# Patient Record
Sex: FEMALE | Race: WHITE | NOT HISPANIC OR LATINO
[De-identification: names, ages, dates, MRNs, and addresses within clinical notes are randomized per-mention and may not be internally consistent; named-entity substitution may affect disease eponyms.]

---

## 2017-03-15 PROBLEM — Z00.00 ENCOUNTER FOR PREVENTIVE HEALTH EXAMINATION: Status: ACTIVE | Noted: 2017-03-15

## 2017-04-27 ENCOUNTER — APPOINTMENT (OUTPATIENT)
Dept: OTOLARYNGOLOGY | Facility: CLINIC | Age: 82
End: 2017-04-27

## 2017-11-27 ENCOUNTER — OUTPATIENT (OUTPATIENT)
Dept: OUTPATIENT SERVICES | Facility: HOSPITAL | Age: 82
LOS: 1 days | Discharge: HOME | End: 2017-11-27

## 2017-11-27 DIAGNOSIS — Z12.31 ENCOUNTER FOR SCREENING MAMMOGRAM FOR MALIGNANT NEOPLASM OF BREAST: ICD-10-CM

## 2018-10-13 ENCOUNTER — OUTPATIENT (OUTPATIENT)
Dept: OUTPATIENT SERVICES | Facility: HOSPITAL | Age: 83
LOS: 1 days | Discharge: HOME | End: 2018-10-13

## 2018-10-13 DIAGNOSIS — M25.562 PAIN IN LEFT KNEE: ICD-10-CM

## 2019-01-25 ENCOUNTER — EMERGENCY (EMERGENCY)
Facility: HOSPITAL | Age: 84
LOS: 0 days | Discharge: HOME | End: 2019-01-26
Attending: EMERGENCY MEDICINE | Admitting: EMERGENCY MEDICINE

## 2019-01-25 VITALS
OXYGEN SATURATION: 99 % | DIASTOLIC BLOOD PRESSURE: 81 MMHG | HEART RATE: 67 BPM | RESPIRATION RATE: 20 BRPM | SYSTOLIC BLOOD PRESSURE: 174 MMHG

## 2019-01-25 DIAGNOSIS — Z88.6 ALLERGY STATUS TO ANALGESIC AGENT: ICD-10-CM

## 2019-01-25 DIAGNOSIS — G89.29 OTHER CHRONIC PAIN: ICD-10-CM

## 2019-01-25 DIAGNOSIS — B34.9 VIRAL INFECTION, UNSPECIFIED: ICD-10-CM

## 2019-01-25 DIAGNOSIS — M25.562 PAIN IN LEFT KNEE: ICD-10-CM

## 2019-01-25 DIAGNOSIS — R41.82 ALTERED MENTAL STATUS, UNSPECIFIED: ICD-10-CM

## 2019-01-25 DIAGNOSIS — E11.9 TYPE 2 DIABETES MELLITUS WITHOUT COMPLICATIONS: ICD-10-CM

## 2019-01-25 LAB
ALBUMIN SERPL ELPH-MCNC: 3.6 G/DL — SIGNIFICANT CHANGE UP (ref 3.5–5.2)
ALP SERPL-CCNC: 82 U/L — SIGNIFICANT CHANGE UP (ref 30–115)
ALT FLD-CCNC: 7 U/L — SIGNIFICANT CHANGE UP (ref 0–41)
ANION GAP SERPL CALC-SCNC: 16 MMOL/L — HIGH (ref 7–14)
APPEARANCE UR: CLEAR — SIGNIFICANT CHANGE UP
AST SERPL-CCNC: 9 U/L — SIGNIFICANT CHANGE UP (ref 0–41)
BASOPHILS # BLD AUTO: 0.02 K/UL — SIGNIFICANT CHANGE UP (ref 0–0.2)
BASOPHILS NFR BLD AUTO: 0.2 % — SIGNIFICANT CHANGE UP (ref 0–1)
BILIRUB SERPL-MCNC: 0.4 MG/DL — SIGNIFICANT CHANGE UP (ref 0.2–1.2)
BILIRUB UR-MCNC: NEGATIVE — SIGNIFICANT CHANGE UP
BUN SERPL-MCNC: 12 MG/DL — SIGNIFICANT CHANGE UP (ref 10–20)
CALCIUM SERPL-MCNC: 9.4 MG/DL — SIGNIFICANT CHANGE UP (ref 8.5–10.1)
CHLORIDE SERPL-SCNC: 95 MMOL/L — LOW (ref 98–110)
CO2 SERPL-SCNC: 27 MMOL/L — SIGNIFICANT CHANGE UP (ref 17–32)
COLOR SPEC: YELLOW — SIGNIFICANT CHANGE UP
CREAT SERPL-MCNC: 0.6 MG/DL — LOW (ref 0.7–1.5)
DIFF PNL FLD: NEGATIVE — SIGNIFICANT CHANGE UP
EOSINOPHIL # BLD AUTO: 0.05 K/UL — SIGNIFICANT CHANGE UP (ref 0–0.7)
EOSINOPHIL NFR BLD AUTO: 0.5 % — SIGNIFICANT CHANGE UP (ref 0–8)
GAS PNL BLDV: SIGNIFICANT CHANGE UP
GLUCOSE SERPL-MCNC: 305 MG/DL — HIGH (ref 70–99)
GLUCOSE UR QL: 250 MG/DL
HCT VFR BLD CALC: 35.2 % — LOW (ref 37–47)
HGB BLD-MCNC: 11.8 G/DL — LOW (ref 12–16)
IMM GRANULOCYTES NFR BLD AUTO: 0.5 % — HIGH (ref 0.1–0.3)
KETONES UR-MCNC: 15
LEUKOCYTE ESTERASE UR-ACNC: NEGATIVE — SIGNIFICANT CHANGE UP
LYMPHOCYTES # BLD AUTO: 1.12 K/UL — LOW (ref 1.2–3.4)
LYMPHOCYTES # BLD AUTO: 12.3 % — LOW (ref 20.5–51.1)
MAGNESIUM SERPL-MCNC: 1.3 MG/DL — LOW (ref 1.8–2.4)
MCHC RBC-ENTMCNC: 28.1 PG — SIGNIFICANT CHANGE UP (ref 27–31)
MCHC RBC-ENTMCNC: 33.5 G/DL — SIGNIFICANT CHANGE UP (ref 32–37)
MCV RBC AUTO: 83.8 FL — SIGNIFICANT CHANGE UP (ref 81–99)
MONOCYTES # BLD AUTO: 0.47 K/UL — SIGNIFICANT CHANGE UP (ref 0.1–0.6)
MONOCYTES NFR BLD AUTO: 5.1 % — SIGNIFICANT CHANGE UP (ref 1.7–9.3)
NEUTROPHILS # BLD AUTO: 7.43 K/UL — HIGH (ref 1.4–6.5)
NEUTROPHILS NFR BLD AUTO: 81.4 % — HIGH (ref 42.2–75.2)
NITRITE UR-MCNC: NEGATIVE — SIGNIFICANT CHANGE UP
NT-PROBNP SERPL-SCNC: 1945 PG/ML — HIGH (ref 0–300)
PH UR: 6 — SIGNIFICANT CHANGE UP (ref 5–8)
PLATELET # BLD AUTO: 346 K/UL — SIGNIFICANT CHANGE UP (ref 130–400)
POTASSIUM SERPL-MCNC: 3.8 MMOL/L — SIGNIFICANT CHANGE UP (ref 3.5–5)
POTASSIUM SERPL-SCNC: 3.8 MMOL/L — SIGNIFICANT CHANGE UP (ref 3.5–5)
PROT SERPL-MCNC: 6.8 G/DL — SIGNIFICANT CHANGE UP (ref 6–8)
PROT UR-MCNC: 30 MG/DL
RBC # BLD: 4.2 M/UL — SIGNIFICANT CHANGE UP (ref 4.2–5.4)
RBC # FLD: 13.4 % — SIGNIFICANT CHANGE UP (ref 11.5–14.5)
SODIUM SERPL-SCNC: 138 MMOL/L — SIGNIFICANT CHANGE UP (ref 135–146)
SP GR SPEC: 1.02 — SIGNIFICANT CHANGE UP (ref 1.01–1.03)
TROPONIN T SERPL-MCNC: <0.01 NG/ML — SIGNIFICANT CHANGE UP
UROBILINOGEN FLD QL: 0.2 MG/DL — SIGNIFICANT CHANGE UP (ref 0.2–0.2)
WBC # BLD: 9.14 K/UL — SIGNIFICANT CHANGE UP (ref 4.8–10.8)
WBC # FLD AUTO: 9.14 K/UL — SIGNIFICANT CHANGE UP (ref 4.8–10.8)

## 2019-01-25 RX ORDER — MAGNESIUM SULFATE 500 MG/ML
2 VIAL (ML) INJECTION ONCE
Qty: 0 | Refills: 0 | Status: COMPLETED | OUTPATIENT
Start: 2019-01-25 | End: 2019-01-25

## 2019-01-25 RX ORDER — SODIUM CHLORIDE 9 MG/ML
500 INJECTION INTRAMUSCULAR; INTRAVENOUS; SUBCUTANEOUS ONCE
Qty: 0 | Refills: 0 | Status: COMPLETED | OUTPATIENT
Start: 2019-01-25 | End: 2019-01-25

## 2019-01-25 RX ADMIN — SODIUM CHLORIDE 500 MILLILITER(S): 9 INJECTION INTRAMUSCULAR; INTRAVENOUS; SUBCUTANEOUS at 21:20

## 2019-01-25 RX ADMIN — Medication 50 GRAM(S): at 22:56

## 2019-01-25 NOTE — ED PROVIDER NOTE - MEDICAL DECISION MAKING DETAILS
viral syndrome, well appearing, active, well hydrated, non-toxic.  No warning signs.  Patient is stable for supportive care and discharge..

## 2019-01-25 NOTE — ED ADULT NURSE NOTE - NSIMPLEMENTINTERV_GEN_ALL_ED
Implemented All Universal Safety Interventions:  [place] to call system. Call bell, personal items and telephone within reach. Instruct patient to call for assistance. Room bathroom lighting operational. Non-slip footwear when patient is off stretcher. Physically safe environment: no spills, clutter or unnecessary equipment. Stretcher in lowest position, wheels locked, appropriate side rails in place.

## 2019-01-25 NOTE — ED PROVIDER NOTE - NSFOLLOWUPCLINICS_GEN_ALL_ED_FT
[facility]  Medicine  [address], [place]   Phone: [phone]  Fax:   Follow Up Time: 1-3 Days

## 2019-01-25 NOTE — ED PROVIDER NOTE - PHYSICAL EXAMINATION
Constitutional: Well developed, well nourished. NAD.  Head: Normocephalic, atraumatic.  Eyes: PERRL. EOMI.  ENT: No nasal discharge. Mucous membranes slightly dry.  Neck: Supple. Painless ROM.  Cardiovascular: Normal S1, S2. Regular rate and rhythm. No murmurs, rubs, or gallops.  Pulmonary: Normal respiratory rate and effort. Right gillian rhonchi.  Abdominal: Soft. Nondistended. Nontender. No rebound, guarding, rigidity.  Extremities. Pelvis stable. No lower extremity edema, symmetric calves.  Skin: No rashes, cyanosis.  Neuro: AAOx3. No focal neurological deficits.  Psych: Normal mood. Normal affect.

## 2019-01-25 NOTE — ED PROVIDER NOTE - PROGRESS NOTE DETAILS
Labs and imaging reviewed and discussed with pt. Pt feels better at this time, is back to baseline per family. Family feels comfortable taking pt home. Will d/c pt with strict return precautions.

## 2019-01-25 NOTE — ED PROVIDER NOTE - OBJECTIVE STATEMENT
Pt is a 89 y/o female with hx of DLD, DM, dementia presents to ED for lethargy that started today. Pt has been coughing for 1 week, + congestion. Today pt developed chills and became lethargic. Sx's are mild. No chest pain, SOB, abd pain, n/v/d, urinary complaints. Pt notes chronic left knee pain, has been followed up for it.

## 2019-01-25 NOTE — ED PROVIDER NOTE - NS ED ROS FT
Constitutional: No fever. + chills.  Eyes: No visual changes.  ENT: No hearing changes. No sore throat.  Neck: No neck pain or stiffness.  Cardiovascular: No chest pain, palpitations, edema.  Pulmonary: No SOB. + cough. No hemoptysis.  Abdominal: No abdominal pain, nausea, vomiting, diarrhea.  : No dysuria, frequency.  Neuro: No headache, syncope, dizziness.  MS: No back pain. No calf pain/swelling.  Psych: No suicidal ideations.

## 2019-01-29 RX ORDER — CEFUROXIME AXETIL 250 MG
1 TABLET ORAL
Qty: 14 | Refills: 0
Start: 2019-01-29 | End: 2019-02-04

## 2019-01-31 LAB
CULTURE RESULTS: SIGNIFICANT CHANGE UP
CULTURE RESULTS: SIGNIFICANT CHANGE UP
SPECIMEN SOURCE: SIGNIFICANT CHANGE UP
SPECIMEN SOURCE: SIGNIFICANT CHANGE UP

## 2019-10-15 ENCOUNTER — EMERGENCY (EMERGENCY)
Facility: HOSPITAL | Age: 84
LOS: 0 days | Discharge: HOME | End: 2019-10-16
Attending: EMERGENCY MEDICINE | Admitting: EMERGENCY MEDICINE
Payer: MEDICARE

## 2019-10-15 VITALS
SYSTOLIC BLOOD PRESSURE: 173 MMHG | TEMPERATURE: 97 F | RESPIRATION RATE: 18 BRPM | OXYGEN SATURATION: 99 % | HEART RATE: 67 BPM | DIASTOLIC BLOOD PRESSURE: 72 MMHG

## 2019-10-15 DIAGNOSIS — Z88.6 ALLERGY STATUS TO ANALGESIC AGENT: ICD-10-CM
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
## 2019-10-15 DIAGNOSIS — M17.0 BILATERAL PRIMARY OSTEOARTHRITIS OF KNEE: ICD-10-CM

## 2019-10-15 DIAGNOSIS — Z90.11 ACQUIRED ABSENCE OF RIGHT BREAST AND NIPPLE: Chronic | ICD-10-CM
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
## 2019-10-15 DIAGNOSIS — M25.562 PAIN IN LEFT KNEE: ICD-10-CM

## 2019-10-15 DIAGNOSIS — S80.211A ABRASION, RIGHT KNEE, INITIAL ENCOUNTER: ICD-10-CM
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
## 2019-10-15 PROCEDURE — 99283 EMERGENCY DEPT VISIT LOW MDM: CPT

## 2019-10-15 RX ORDER — SIMVASTATIN 20 MG/1
0 TABLET, FILM COATED ORAL
Qty: 0 | Refills: 0 | DISCHARGE

## 2019-10-15 RX ORDER — ACETAMINOPHEN 500 MG
650 TABLET ORAL ONCE
Refills: 0 | Status: COMPLETED | OUTPATIENT
Start: 2019-10-15 | End: 2019-10-15

## 2019-10-15 RX ORDER — QUETIAPINE FUMARATE 200 MG/1
0 TABLET, FILM COATED ORAL
Qty: 0 | Refills: 0 | DISCHARGE

## 2019-10-15 RX ORDER — DONEPEZIL HYDROCHLORIDE 10 MG/1
0 TABLET, FILM COATED ORAL
Qty: 0 | Refills: 0 | DISCHARGE

## 2019-10-15 RX ORDER — METFORMIN HYDROCHLORIDE 850 MG/1
0 TABLET ORAL
Qty: 0 | Refills: 0 | DISCHARGE

## 2019-10-15 RX ADMIN — Medication 650 MILLIGRAM(S): at 23:39

## 2019-10-15 NOTE — ED ADULT NURSE NOTE - NSIMPLEMENTINTERV_GEN_ALL_ED
Implemented All Fall with Harm Risk Interventions:  [place] to call system. Call bell, personal items and telephone within reach. Instruct patient to call for assistance. Room bathroom lighting operational. Non-slip footwear when patient is off stretcher. Physically safe environment: no spills, clutter or unnecessary equipment. Stretcher in lowest position, wheels locked, appropriate side rails in place. Provide visual cue, wrist band, yellow gown, etc. Monitor gait and stability. Monitor for mental status changes and reorient to person, place, and time. Review medications for side effects contributing to fall risk. Reinforce activity limits and safety measures with patient and family. Provide visual clues: red socks.

## 2019-10-15 NOTE — ED PROVIDER NOTE - ATTENDING CONTRIBUTION TO CARE
89 yo F, history of dementia, OA, DM II, HLD, breast ca sp mastectomy here for assessment of bilateral knee sp pain after tripping and landing on her knees.     No head trauma, no LOC. Ambulatory on scene and after in ED. Initially had minimal pain but then developed aching pain prompting ED visit.    VS normal, patient looks well, is ambulating, has no warmth, redness, swelling or step off.     Likely bruising vs exacerbation of OA however will get XR and reassess.

## 2019-10-15 NOTE — ED PROVIDER NOTE - PHYSICAL EXAMINATION
PHYSICAL EXAM:  GENERAL: NAD, well-developed  HEAD:  Atraumatic, Normocephalic  EYES: EOMI, PERRLA, conjunctiva and sclera clear  NECK: Supple, No JVD  CHEST/LUNG: Clear to auscultation bilaterally; No wheeze  HEART: Regular rate and rhythm; No murmurs, rubs, or gallops  ABDOMEN: Soft, Nontender, Nondistended; Bowel sounds present  EXTREMITIES:  2+ Peripheral Pulses, No clubbing, cyanosis, or edema  PSYCH: AAOx3  NEUROLOGY: non-focal  SKIN: right knee abrasions, left knee tender to touch, reduced passive ROM unable to fully extend, left knee slightly warm to touch

## 2019-10-15 NOTE — ED PROVIDER NOTE - PATIENT PORTAL LINK FT
You can access the FollowMyHealth Patient Portal offered by [facility] by registering at the following website: [email or web address]. By joining [facility]’s FollowMyHealth portal, you will also be able to view your health information using other applications (apps) compatible with our system.

## 2019-10-15 NOTE — ED ADULT NURSE NOTE - OBJECTIVE STATEMENT
pt came to ED with her daughter s/p tripping and falling at home, c/o left knee pain   has abrasions to right knee

## 2019-10-15 NOTE — ED PROVIDER NOTE - NSFOLLOWUPINSTRUCTIONS_ED_ALL_ED_FT
Arthritis    Arthritis is a term that is commonly used to refer to joint pain or joint disease. There are more than 100 types of arthritis.    What are the causes?  The most common cause of this condition is wear and tear of a joint. Other causes include:  Gout.  Inflammation of a joint.  An infection of a joint.  Sprains and other injuries near the joint.  A drug reaction or allergic reaction.  In some cases, the cause may not be known.    What are the signs or symptoms?  The main symptom of this condition is pain in the joint with movement. Other symptoms include:  Redness, swelling, or stiffness at a joint.  Warmth coming from the joint.  Fever.  Overall feeling of illness.    How is this diagnosed?  This condition may be diagnosed with a physical exam and tests, including:  Blood tests.  Urine tests.  Imaging tests, such as MRI, X-rays, or a CT scan.  Sometimes, fluid is removed from a joint for testing.  How is this treated?  Treatment for this condition may involve:  Treatment of the cause, if it is known.  Rest.  Raising (elevating) the joint.  Applying cold or hot packs to the joint.  Medicines to improve symptoms and reduce inflammation.  Injections of a steroid such as cortisone into the joint to help reduce pain and inflammation.  Depending on the cause of your arthritis, you may need to make lifestyle changes to reduce stress on your joint. These changes may include exercising more and losing weight.  Follow these instructions at home:  Medicines     Take over-the-counter and prescription medicines only as told by your health care provider.    Do not take aspirin to relieve pain if gout is suspected.  Activity     Rest your joint if told by your health care provider. Rest is important when your disease is active and your joint feels painful, swollen, or stiff.  Avoid activities that make the pain worse. It is important to balance activity with rest.  Exercise your joint regularly with range-of-motion exercises as told by your health care provider. Try doing low-impact exercise, such as:  Swimming.  Water aerobics  .Biking.  Walking.  Joint Care       If your joint is swollen, keep it elevated if told by your health care provider.  If your joint feels stiff in the morning, try taking a warm shower.  If directed, apply heat to the joint. If you have diabetes, do not apply heat without permission from your health care provider.  Put a towel between the joint and the hot pack or heating pad.  Leave the heat on the area for 20–30 minutes.  If directed, apply ice to the joint:  Put ice in a plastic bag.  Place a towel between your skin and the bag.  Leave the ice on for 20 minutes, 2–3 times per day.  Keep all follow-up visits as told by your health care provider. This is important.    Contact a health care provider if:  The pain gets worse.  You have a fever.  Get help right away if:  You develop severe joint pain, swelling, or redness.  Many joints become painful and swollen.  You develop severe back pain.  You develop severe weakness in your leg.  You cannot control your bladder or bowels.    This information is not intended to replace advice given to you by your health care provider. Make sure you discuss any questions you have with your health care provider.

## 2019-10-15 NOTE — ED PROVIDER NOTE - CLINICAL SUMMARY MEDICAL DECISION MAKING FREE TEXT BOX
XR negative, after tylenol patient is ambulatory without difficulty. No signs of fracture, dislocation.    Advised on return precautions, fall safety at home, tylenol prn pain.

## 2019-10-15 NOTE — ED PROVIDER NOTE - OBJECTIVE STATEMENT
89 yo F w/ hx of dementia, b/l knee OA, DM, DLD, breast CA s/p right mastectomy and chemo p/w b/l knee pain. Left > right. Pt fell on her knees today while washing clothes, she lost balance trying to reach for pail and landed on knees, witnessed by aide, was able to get up and walk after, however c/o pain 1 hour later, not able to characterize, no radiation, 10/10, improves with not applying pressure, worse with ambulation. Denies vertigo, dizziness, LOC, palpitations, CP, SOB at time of fall. She was able to walk in to ED on her own. Denies prior hx of fall.

## 2019-10-15 NOTE — ED PROVIDER NOTE - NS ED ROS FT

## 2019-10-15 NOTE — ED PROVIDER NOTE - CARE PLAN
Principal Discharge DX:	Osteoarthritis of knees, bilateral  Goal:	pain control  Assessment and plan of treatment:	patient can take over the counter pain medication such as Tylenol for pain

## 2019-10-16 PROBLEM — F32.9 MAJOR DEPRESSIVE DISORDER, SINGLE EPISODE, UNSPECIFIED: Chronic | Status: ACTIVE | Noted: 2019-01-25

## 2019-10-16 PROBLEM — E11.9 TYPE 2 DIABETES MELLITUS WITHOUT COMPLICATIONS: Chronic | Status: ACTIVE | Noted: 2019-01-25

## 2019-10-16 PROBLEM — I10 ESSENTIAL (PRIMARY) HYPERTENSION: Chronic | Status: ACTIVE | Noted: 2019-01-25

## 2019-10-16 PROCEDURE — 73562 X-RAY EXAM OF KNEE 3: CPT | Mod: 26,LT,RT

## 2020-03-12 NOTE — ED ADULT NURSE NOTE - NS ED NOTE ABUSE RESPONSE YN
Instructions: This plan will send the code FBSD to the PM system.  DO NOT or CHANGE the price. Price (Do Not Change): 0.00 Detail Level: Simple Yes

## 2020-09-05 ENCOUNTER — INPATIENT (INPATIENT)
Facility: HOSPITAL | Age: 85
LOS: 5 days | Discharge: NURSING, REHAB OR LONG-TERM CARE | End: 2020-09-11
Attending: INTERNAL MEDICINE | Admitting: INTERNAL MEDICINE
Payer: MEDICARE

## 2020-09-05 VITALS
DIASTOLIC BLOOD PRESSURE: 58 MMHG | HEART RATE: 71 BPM | OXYGEN SATURATION: 97 % | SYSTOLIC BLOOD PRESSURE: 129 MMHG | TEMPERATURE: 98 F | RESPIRATION RATE: 18 BRPM

## 2020-09-05 DIAGNOSIS — S72.115A NONDISPLACED FRACTURE OF GREATER TROCHANTER OF LEFT FEMUR, INITIAL ENCOUNTER FOR CLOSED FRACTURE: ICD-10-CM

## 2020-09-05 DIAGNOSIS — D62 ACUTE POSTHEMORRHAGIC ANEMIA: ICD-10-CM

## 2020-09-05 DIAGNOSIS — Z90.11 ACQUIRED ABSENCE OF RIGHT BREAST AND NIPPLE: ICD-10-CM
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
## 2020-09-05 DIAGNOSIS — Z79.84 LONG TERM (CURRENT) USE OF ORAL HYPOGLYCEMIC DRUGS: ICD-10-CM

## 2020-09-05 DIAGNOSIS — Z92.21 PERSONAL HISTORY OF ANTINEOPLASTIC CHEMOTHERAPY: ICD-10-CM

## 2020-09-05 DIAGNOSIS — I11.0 HYPERTENSIVE HEART DISEASE WITH HEART FAILURE: ICD-10-CM

## 2020-09-05 DIAGNOSIS — E78.5 HYPERLIPIDEMIA, UNSPECIFIED: ICD-10-CM

## 2020-09-05 DIAGNOSIS — E83.42 HYPOMAGNESEMIA: ICD-10-CM

## 2020-09-05 DIAGNOSIS — E87.6 HYPOKALEMIA: ICD-10-CM

## 2020-09-05 DIAGNOSIS — Z85.3 PERSONAL HISTORY OF MALIGNANT NEOPLASM OF BREAST: ICD-10-CM
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
## 2020-09-05 DIAGNOSIS — S72.144A NONDISPLACED INTERTROCHANTERIC FRACTURE OF RIGHT FEMUR, INITIAL ENCOUNTER FOR CLOSED FRACTURE: ICD-10-CM

## 2020-09-05 DIAGNOSIS — F02.81 DEMENTIA IN OTHER DISEASES CLASSIFIED ELSEWHERE, UNSPECIFIED SEVERITY, WITH BEHAVIORAL DISTURBANCE: ICD-10-CM

## 2020-09-05 DIAGNOSIS — I44.7 LEFT BUNDLE-BRANCH BLOCK, UNSPECIFIED: ICD-10-CM

## 2020-09-05 DIAGNOSIS — S72.001A FRACTURE OF UNSPECIFIED PART OF NECK OF RIGHT FEMUR, INITIAL ENCOUNTER FOR CLOSED FRACTURE: ICD-10-CM

## 2020-09-05 DIAGNOSIS — Z90.11 ACQUIRED ABSENCE OF RIGHT BREAST AND NIPPLE: Chronic | ICD-10-CM

## 2020-09-05 DIAGNOSIS — R50.9 FEVER, UNSPECIFIED: ICD-10-CM

## 2020-09-05 DIAGNOSIS — G30.9 ALZHEIMER'S DISEASE, UNSPECIFIED: ICD-10-CM

## 2020-09-05 DIAGNOSIS — Z99.3 DEPENDENCE ON WHEELCHAIR: ICD-10-CM

## 2020-09-05 DIAGNOSIS — F32.9 MAJOR DEPRESSIVE DISORDER, SINGLE EPISODE, UNSPECIFIED: ICD-10-CM
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
## 2020-09-05 DIAGNOSIS — E11.65 TYPE 2 DIABETES MELLITUS WITH HYPERGLYCEMIA: ICD-10-CM

## 2020-09-05 DIAGNOSIS — I50.22 CHRONIC SYSTOLIC (CONGESTIVE) HEART FAILURE: ICD-10-CM

## 2020-09-05 PROBLEM — C50.919 MALIGNANT NEOPLASM OF UNSPECIFIED SITE OF UNSPECIFIED FEMALE BREAST: Chronic | Status: ACTIVE | Noted: 2019-10-15

## 2020-09-05 LAB
ALBUMIN SERPL ELPH-MCNC: 3.4 G/DL — LOW (ref 3.5–5.2)
ALP SERPL-CCNC: 66 U/L — SIGNIFICANT CHANGE UP (ref 30–115)
ALT FLD-CCNC: 9 U/L — SIGNIFICANT CHANGE UP (ref 0–41)
ANION GAP SERPL CALC-SCNC: 12 MMOL/L — SIGNIFICANT CHANGE UP (ref 7–14)
AST SERPL-CCNC: 24 U/L — SIGNIFICANT CHANGE UP (ref 0–41)
BASOPHILS # BLD AUTO: 0.05 K/UL — SIGNIFICANT CHANGE UP (ref 0–0.2)
BASOPHILS NFR BLD AUTO: 0.5 % — SIGNIFICANT CHANGE UP (ref 0–1)
BILIRUB SERPL-MCNC: 0.2 MG/DL — SIGNIFICANT CHANGE UP (ref 0.2–1.2)
BUN SERPL-MCNC: 19 MG/DL — SIGNIFICANT CHANGE UP (ref 10–20)
CALCIUM SERPL-MCNC: 8.2 MG/DL — LOW (ref 8.5–10.1)
CHLORIDE SERPL-SCNC: 105 MMOL/L — SIGNIFICANT CHANGE UP (ref 98–110)
CO2 SERPL-SCNC: 24 MMOL/L — SIGNIFICANT CHANGE UP (ref 17–32)
CREAT SERPL-MCNC: 0.7 MG/DL — SIGNIFICANT CHANGE UP (ref 0.7–1.5)
EOSINOPHIL # BLD AUTO: 0.14 K/UL — SIGNIFICANT CHANGE UP (ref 0–0.7)
EOSINOPHIL NFR BLD AUTO: 1.4 % — SIGNIFICANT CHANGE UP (ref 0–8)
GLUCOSE SERPL-MCNC: 107 MG/DL — HIGH (ref 70–99)
HCT VFR BLD CALC: 26.6 % — LOW (ref 37–47)
HGB BLD-MCNC: 8.3 G/DL — LOW (ref 12–16)
IMM GRANULOCYTES NFR BLD AUTO: 0.4 % — HIGH (ref 0.1–0.3)
LYMPHOCYTES # BLD AUTO: 1.1 K/UL — LOW (ref 1.2–3.4)
LYMPHOCYTES # BLD AUTO: 11 % — LOW (ref 20.5–51.1)
MCHC RBC-ENTMCNC: 26.9 PG — LOW (ref 27–31)
MCHC RBC-ENTMCNC: 31.2 G/DL — LOW (ref 32–37)
MCV RBC AUTO: 86.1 FL — SIGNIFICANT CHANGE UP (ref 81–99)
MONOCYTES # BLD AUTO: 0.55 K/UL — SIGNIFICANT CHANGE UP (ref 0.1–0.6)
MONOCYTES NFR BLD AUTO: 5.5 % — SIGNIFICANT CHANGE UP (ref 1.7–9.3)
NEUTROPHILS # BLD AUTO: 8.13 K/UL — HIGH (ref 1.4–6.5)
NEUTROPHILS NFR BLD AUTO: 81.2 % — HIGH (ref 42.2–75.2)
NRBC # BLD: 0 /100 WBCS — SIGNIFICANT CHANGE UP (ref 0–0)
PLATELET # BLD AUTO: 336 K/UL — SIGNIFICANT CHANGE UP (ref 130–400)
POTASSIUM SERPL-MCNC: 5.1 MMOL/L — HIGH (ref 3.5–5)
POTASSIUM SERPL-SCNC: 5.1 MMOL/L — HIGH (ref 3.5–5)
PROT SERPL-MCNC: 6 G/DL — SIGNIFICANT CHANGE UP (ref 6–8)
RBC # BLD: 3.09 M/UL — LOW (ref 4.2–5.4)
RBC # FLD: 13.9 % — SIGNIFICANT CHANGE UP (ref 11.5–14.5)
SODIUM SERPL-SCNC: 141 MMOL/L — SIGNIFICANT CHANGE UP (ref 135–146)
WBC # BLD: 10.01 K/UL — SIGNIFICANT CHANGE UP (ref 4.8–10.8)
WBC # FLD AUTO: 10.01 K/UL — SIGNIFICANT CHANGE UP (ref 4.8–10.8)

## 2020-09-05 PROCEDURE — 73552 X-RAY EXAM OF FEMUR 2/>: CPT | Mod: 26,LT

## 2020-09-05 PROCEDURE — 71045 X-RAY EXAM CHEST 1 VIEW: CPT | Mod: 26

## 2020-09-05 PROCEDURE — 73522 X-RAY EXAM HIPS BI 3-4 VIEWS: CPT | Mod: 26

## 2020-09-05 PROCEDURE — 99285 EMERGENCY DEPT VISIT HI MDM: CPT

## 2020-09-05 PROCEDURE — 72192 CT PELVIS W/O DYE: CPT | Mod: 26

## 2020-09-05 PROCEDURE — 72125 CT NECK SPINE W/O DYE: CPT | Mod: 26

## 2020-09-05 PROCEDURE — 70450 CT HEAD/BRAIN W/O DYE: CPT | Mod: 26

## 2020-09-05 PROCEDURE — 93970 EXTREMITY STUDY: CPT | Mod: 26

## 2020-09-05 RX ORDER — ALPRAZOLAM 0.25 MG
0.5 TABLET ORAL ONCE
Refills: 0 | Status: DISCONTINUED | OUTPATIENT
Start: 2020-09-05 | End: 2020-09-05

## 2020-09-05 RX ORDER — ACETAMINOPHEN 500 MG
650 TABLET ORAL ONCE
Refills: 0 | Status: COMPLETED | OUTPATIENT
Start: 2020-09-05 | End: 2020-09-05

## 2020-09-05 RX ADMIN — Medication 650 MILLIGRAM(S): at 20:43

## 2020-09-05 RX ADMIN — Medication 0.5 MILLIGRAM(S): at 20:57

## 2020-09-05 RX ADMIN — Medication 650 MILLIGRAM(S): at 19:43

## 2020-09-05 NOTE — ED ADULT TRIAGE NOTE - CHIEF COMPLAINT QUOTE
As per family patient taken to [facility] ED yesterday after falling out of bed, today received a phone call from hospital asking her to return after finding hairline fx to left hip

## 2020-09-05 NOTE — CONSULT NOTE ADULT - SUBJECTIVE AND OBJECTIVE BOX
ORTHO CONSULT NOTE    TONYA MARTINEZ  [id number]    Patient is a 89 year old Female who presents to ED after mechanical fall two days ago. Since the fall, she has been unable to bear weight or ambulate. No head trauma or LOC. She presented to outside hospital where she was told she has a left GT fracture. Ambulates with cane at baseline. Denies f/c/cp/sob.    PMH: GREATER TROCHANTER FRACTURE  ^GREATER TROCHANTER FRACTURE  No pertinent family history in first degree relatives  MEWS Score  Breast CA  Depression  Diabetes  Hypertension    PSH: R mastectomy  SH: denies toxic habits  Medications: per chart    Allergies: aspirin (Unknown)      Vitals:  T(C): 36.8 (09-05-20 @ 19:19), Max: 36.8 (09-05-20 @ 19:19)  HR: 76 (09-05-20 @ 19:19) (71 - 76)  BP: 155/67 (09-05-20 @ 19:19) (129/58 - 155/67)  RR: 18 (09-05-20 @ 19:19) (18 - 18)  SpO2: 94% (09-05-20 @ 19:19) (94% - 97%)    PE:  NAD  Unlabored resp on RA  Resting comfortably    LLE:  skin intact  no gross deformity  No pain on log roll/axial load  NTTP knee thigh  TTP GT  No crepitus or step offs  Compartments soft, compressible  SILT sp/dp/s/s/t  Motor intact ehl/fhl/ta/gs  DP palpable  wwp, bcr    RLE:  skin intact  no gross deformity  No pain on log roll/axial load  NTTP knee, thigh  No crepitus or step offs  Compartments soft, compressible  SILT sp/dp/s/s/t  Motor intact ehl/fhl/ta/gs  DP palpable  wwp, bcr      Labs:                        8.3    10.01 )-----------( 336      ( 05 Sep 2020 15:21 )             26.6     09-05    141  |  105  |  19  ----------------------------<  107<H>  5.1<H>   |  24  |  0.7    Ca    8.2<L>      05 Sep 2020 15:21    TPro  6.0  /  Alb  3.4<L>  /  TBili  0.2  /  DBili  x   /  AST  24  /  ALT  9   /  AlkPhos  66  09-05    LIVER FUNCTIONS - ( 05 Sep 2020 15:21 )  Alb: 3.4 g/dL / Pro: 6.0 g/dL / ALK PHOS: 66 U/L / ALT: 9 U/L / AST: 24 U/L / GGT: x             Imaging:  XR Pelvis, hip, femur: L greater trochanter fracture without obvious IT extension  CT pelvis: L greater trochanter fracture without obvious IT extension    A/P: 89 year old Female with L greater trochanter fracture without obvious IT extension  - Recommend MRI pelvis to rule out left IT extension or R side injury  - Recommend Duplex BLE to rule out DVT as patient has been unable to ambulate since fall x 2 days  - pain control  - NWB BLE for now until MRI  - DVT ppx: chem ppx, SCD's  - Covid testing  - CBC, BMP, coags, Type and screen x 2  - IS  - Ortho following ORTHO CONSULT NOTE    TONYA MARTINEZ  [id number]    Time of consult: 20:00  Patient seen at: 20:30    Patient is a 89 year old Female who presents to ED after mechanical fall two days ago. Since the fall, she has been unable to bear weight or ambulate. No head trauma or LOC. She presented to outside hospital where she was told she has a left GT fracture. Ambulates with cane at baseline. Denies f/c/cp/sob.    PMH: GREATER TROCHANTER FRACTURE  ^GREATER TROCHANTER FRACTURE  No pertinent family history in first degree relatives  MEWS Score  Breast CA  Depression  Diabetes  Hypertension    PSH: R mastectomy  SH: denies toxic habits  Medications: per chart    Allergies: aspirin (Unknown)      Vitals:  T(C): 36.8 (09-05-20 @ 19:19), Max: 36.8 (09-05-20 @ 19:19)  HR: 76 (09-05-20 @ 19:19) (71 - 76)  BP: 155/67 (09-05-20 @ 19:19) (129/58 - 155/67)  RR: 18 (09-05-20 @ 19:19) (18 - 18)  SpO2: 94% (09-05-20 @ 19:19) (94% - 97%)    PE:  NAD  Unlabored resp on RA  Resting comfortably    LLE:  skin intact  no gross deformity  No pain on log roll/axial load  NTTP knee thigh  TTP GT  No crepitus or step offs  Compartments soft, compressible  SILT sp/dp/s/s/t  Motor intact ehl/fhl/ta/gs  DP palpable  wwp, bcr    RLE:  skin intact  no gross deformity  No pain on log roll/axial load  NTTP knee, thigh  No crepitus or step offs  Compartments soft, compressible  SILT sp/dp/s/s/t  Motor intact ehl/fhl/ta/gs  DP palpable  wwp, bcr      Labs:                        8.3    10.01 )-----------( 336      ( 05 Sep 2020 15:21 )             26.6     09-05    141  |  105  |  19  ----------------------------<  107<H>  5.1<H>   |  24  |  0.7    Ca    8.2<L>      05 Sep 2020 15:21    TPro  6.0  /  Alb  3.4<L>  /  TBili  0.2  /  DBili  x   /  AST  24  /  ALT  9   /  AlkPhos  66  09-05    LIVER FUNCTIONS - ( 05 Sep 2020 15:21 )  Alb: 3.4 g/dL / Pro: 6.0 g/dL / ALK PHOS: 66 U/L / ALT: 9 U/L / AST: 24 U/L / GGT: x             Imaging:  XR Pelvis, hip, femur: L greater trochanter fracture without obvious IT extension  CT pelvis: L greater trochanter fracture without obvious IT extension    A/P: 89 year old Female with L greater trochanter fracture without obvious IT extension  - Recommend MRI pelvis to rule out left IT extension or R side injury  - Recommend Duplex BLE to rule out DVT as patient has been unable to ambulate since fall x 2 days  - pain control  - NWB BLE for now until MRI  - DVT ppx: chem ppx, SCD's  - Covid testing  - CBC, BMP, coags, Type and screen x 2  - IS  - Ortho following ORTHO CONSULT NOTE    TONYA MARTINEZ  [id number]    Time of consult: 20:00  Patient seen at: 20:30    Patient is a 89 year old Female who presents to ED after mechanical fall two days ago. Since the fall, she has been unable to bear weight or ambulate. No head trauma or LOC. She presented to outside hospital where she was told she has a left GT fracture. Ambulates with cane at baseline. Denies f/c/cp/sob.    PMH: GREATER TROCHANTER FRACTURE  ^GREATER TROCHANTER FRACTURE  No pertinent family history in first degree relatives  MEWS Score  Breast CA  Depression  Diabetes  Hypertension    PSH: R mastectomy  SH: denies toxic habits  Medications: per chart    Allergies: aspirin (Unknown)      Vitals:  T(C): 36.8 (09-05-20 @ 19:19), Max: 36.8 (09-05-20 @ 19:19)  HR: 76 (09-05-20 @ 19:19) (71 - 76)  BP: 155/67 (09-05-20 @ 19:19) (129/58 - 155/67)  RR: 18 (09-05-20 @ 19:19) (18 - 18)  SpO2: 94% (09-05-20 @ 19:19) (94% - 97%)    PE:  NAD  Unlabored resp on RA  Resting comfortably    LLE:  skin intact  no gross deformity  No pain on log roll/axial load  NTTP knee thigh  TTP GT  No crepitus or step offs  Compartments soft, compressible  SILT sp/dp/s/s/t  Motor intact ehl/fhl/ta/gs  DP palpable  wwp, bcr    RLE:  skin intact  no gross deformity  No pain on log roll/axial load  NTTP knee, thigh  No crepitus or step offs  Compartments soft, compressible  SILT sp/dp/s/s/t  Motor intact ehl/fhl/ta/gs  DP palpable  wwp, bcr      Labs:                        8.3    10.01 )-----------( 336      ( 05 Sep 2020 15:21 )             26.6     09-05    141  |  105  |  19  ----------------------------<  107<H>  5.1<H>   |  24  |  0.7    Ca    8.2<L>      05 Sep 2020 15:21    TPro  6.0  /  Alb  3.4<L>  /  TBili  0.2  /  DBili  x   /  AST  24  /  ALT  9   /  AlkPhos  66  09-05    LIVER FUNCTIONS - ( 05 Sep 2020 15:21 )  Alb: 3.4 g/dL / Pro: 6.0 g/dL / ALK PHOS: 66 U/L / ALT: 9 U/L / AST: 24 U/L / GGT: x             Imaging:  XR Pelvis, hip, femur: L greater trochanter fracture without obvious IT extension. Possible R valgus impacted femoral neck fracture of unknown chronicity  CT pelvis: L greater trochanter fracture without obvious IT extension. Possible R valgus impacted femoral neck fracture of unknown chronicity    A/P: 89 year old Female with L greater trochanter fracture without obvious IT extension. Possible R valgus impacted femoral neck fracture of unknown chronicity  - Recommend MRI pelvis to rule out left IT extension or R side fracture  - Recommend Duplex BLE to rule out DVT as patient has been unable to ambulate since fall x 2 days  - pain control  - NWB BLE for now until MRI  - DVT ppx: chem ppx, SCD's  - Covid testing  - CBC, BMP, coags, Type and screen x 2  - IS  - Ortho following

## 2020-09-05 NOTE — ED PROVIDER NOTE - CLINICAL SUMMARY MEDICAL DECISION MAKING FREE TEXT BOX
Patient presents with hip pain after a fall. Was called by another ED for positive hip fracture found on xray. labs, xray, ct done. Found to have an acute hip fracture. Patient admitted for further management.

## 2020-09-05 NOTE — ED PROVIDER NOTE - PROGRESS NOTE DETAILS
ATTENDING NOTE: I personally evaluated the patient. I reviewed the Physician Assistant’s note (as assigned above), and agree with the findings and plan except as documented in my note.   89 y/o F with PMH of DM and dementia, had a witnessed fall out of bed Thursday morning, yesterday was seen at [place] where she had XR done, and was sent home but received a call today stating she has a hip FX. Family lives in  and brought her here for further treatment. Pt notes R hip pain over this time and is able to ambulate with a cane, but is limping. No other pain or injuries; no AC. As per family, pt is acting her nl self. Pt has advanced dementia so unable to characterize her SX.   CONSTITUTIONAL: Well-developed; well-nourished; in no acute distress. SKIN: warm, dry. HEAD: Normocephalic; atraumatic. EYES: PERRL, EOMI, no conjunctival erythema. ENT: No nasal discharge; airway clear. NECK: Supple; non tender. CARD: S1, S2 normal. Regular rate and rhythm.  RESP: No wheezes, rales or rhonchi. ABD: soft non tender, non distended, no rebound or guarding. EXT: (+) TTP over anterior R hip with 4/5 strength compared to L which is 5/5, NVI, (+) pain with rolling of the RLE. LYMPH: No acute cervical adenopathy. NEURO: responsive to painful stimuli, no focal deficits, neurovascularly intact. PSYCH: Cooperative, appropriate.  Plan for blood work, CXR and CT. Discussed case with ortho, will see pt in ED

## 2020-09-05 NOTE — ED ADULT NURSE NOTE - NSIMPLEMENTINTERV_GEN_ALL_ED
Implemented All Fall with Harm Risk Interventions:  [place] to call system. Call bell, personal items and telephone within reach. Instruct patient to call for assistance. Room bathroom lighting operational. Non-slip footwear when patient is off stretcher. Physically safe environment: no spills, clutter or unnecessary equipment. Stretcher in lowest position, wheels locked, appropriate side rails in place. Provide visual cue, wrist band, yellow gown, etc. Monitor gait and stability. Monitor for mental status changes and reorient to person, place, and time. Review medications for side effects contributing to fall risk. Reinforce activity limits and safety measures with patient and family. Provide visual clues: red socks.

## 2020-09-05 NOTE — ED PROVIDER NOTE - OBJECTIVE STATEMENT
89 year old F coming from home, hx of DM, DLD, breast ca s/p rt mastectomy/chemo presenting for poss L hip fx. As per daughter pt had witnesses fall out of bed x 2 days ago onto L hip. No head trauma/loc. STs went to [facility] ED yesterday and was called back for L hip fx. Pt is mostly wheelchair bound. She has otherwise been acting at baseline since fall. No decreased po intake, vomiting, worsening confusion, cough, fever, uri symptoms.

## 2020-09-05 NOTE — ED PROVIDER NOTE - PHYSICAL EXAMINATION
CONSTITUTIONAL: Well-appearing; well-nourished; in no apparent distress.   EYES: PERRL; EOM intact.   ENT: normal nose; no rhinorrhea; normal pharynx with no tonsillar hypertrophy.   NECK: Supple; non-tender; no cervical lymphadenopathy.   CARDIOVASCULAR: Normal S1, S2; no murmurs, rubs, or gallops.   RESPIRATORY: Normal chest excursion with respiration; breath sounds clear and equal bilaterally; no wheezes, rhonchi, or rales.  GI/: Normal bowel sounds; non-distended; non-tender; no palpable organomegaly.   MS: No evidence of trauma or deformity. Normal ROM in all four extremities; non-tender to palpation; Pedal pulses intact  SKIN: Normal for age and race; warm; dry; good turgor; no apparent lesions or exudate. No ecchymosis, abrasions, lacerations

## 2020-09-05 NOTE — ED PROVIDER NOTE - ATTENDING CONTRIBUTION TO CARE
ATTENDING NOTE: I personally evaluated the patient. I reviewed the Physician Assistant’s note (as assigned above), and agree with the findings and plan except as documented in my note.   89 y/o F with PMH of DM and dementia, had a witnessed fall out of bed Thursday morning, yesterday was seen at [place] where she had XR done, and was sent home but received a call today stating she has a hip FX. Family lives in  and brought her here for further treatment. Pt notes R hip pain over this time and is able to ambulate with a cane, but is limping. No other pain or injuries; no AC. As per family, pt is acting her nl self. Pt has advanced dementia so unable to characterize her SX.   CONSTITUTIONAL: Well-developed; well-nourished; in no acute distress. SKIN: warm, dry. HEAD: Normocephalic; atraumatic. EYES: PERRL, EOMI, no conjunctival erythema. ENT: No nasal discharge; airway clear. NECK: Supple; non tender. CARD: S1, S2 normal. Regular rate and rhythm.  RESP: No wheezes, rales or rhonchi. ABD: soft non tender, non distended, no rebound or guarding. EXT: (+) TTP over anterior R hip with 4/5 strength compared to L which is 5/5, NVI, (+) pain with rolling of the RLE. LYMPH: No acute cervical adenopathy. NEURO: responsive to painful stimuli, no focal deficits, neurovascularly intact. PSYCH: Cooperative, appropriate.  Plan for blood work, CXR and CT.

## 2020-09-05 NOTE — CONSULT NOTE ADULT - ATTENDING COMMENTS
agree with above  will need beth hip mri  need to eval if GT fx on left extends into hip and to see if possible impacted FN fx on right is chronic/acute  NWB BLE

## 2020-09-05 NOTE — ED ADULT NURSE NOTE - CHIEF COMPLAINT QUOTE
As per family patient taken to [facility] ED yesterday after falling out of bed, today received a phone call from hospital asking her to return after finding hairline fx to left hip

## 2020-09-05 NOTE — ED ADULT NURSE NOTE - SUICIDE SCREENING QUESTION 3
I will START or STAY ON the medications listed below when I get home from the hospital:    aspirin 81 mg oral tablet  -- 1 tab(s) by mouth once a day  -- Indication: For home med     acetaminophen 325 mg oral tablet  -- 2 tab(s) by mouth every 6 hours, As needed, Mild Pain (1 - 3)  -- Indication: For for mild pain     senna oral tablet  -- 2 tab(s) by mouth once a day (at bedtime), As needed, Constipation  -- Indication: For bowel regimen     docusate sodium 100 mg oral capsule  -- 1 cap(s) by mouth 3 times a day  -- Indication: For bowel regimen
No

## 2020-09-05 NOTE — ED ADULT NURSE NOTE - OBJECTIVE STATEMENT
Patient fell yesterday and was seen at [facility]- patient was d/lluvia home and called to come back for hip fracture. Patient c/o left sided hip pain and knee pain, no obvious s/s of deformity noted. +pulses present in left LLE.

## 2020-09-05 NOTE — ED ADULT NURSE REASSESSMENT NOTE - NS ED NURSE REASSESS COMMENT FT1
Pt reassessed at change of shift. VSS. Pt c/o right hip pain pa lance made aware. Tylenol ordered. Daughter at bedside. will continue to monitor. Pt reassessed at change of shift. VSS. Pt c/o left hip pain pa lance made aware. Tylenol ordered. Daughter at bedside. will continue to monitor.

## 2020-09-06 DIAGNOSIS — R09.89 OTHER SPECIFIED SYMPTOMS AND SIGNS INVOLVING THE CIRCULATORY AND RESPIRATORY SYSTEMS: ICD-10-CM

## 2020-09-06 LAB
ALBUMIN SERPL ELPH-MCNC: 3.5 G/DL — SIGNIFICANT CHANGE UP (ref 3.5–5.2)
ALP SERPL-CCNC: 68 U/L — SIGNIFICANT CHANGE UP (ref 30–115)
ALT FLD-CCNC: 9 U/L — SIGNIFICANT CHANGE UP (ref 0–41)
ANION GAP SERPL CALC-SCNC: 12 MMOL/L — SIGNIFICANT CHANGE UP (ref 7–14)
ANION GAP SERPL CALC-SCNC: 14 MMOL/L — SIGNIFICANT CHANGE UP (ref 7–14)
APTT BLD: 27.2 SEC — SIGNIFICANT CHANGE UP (ref 27–39.2)
AST SERPL-CCNC: 15 U/L — SIGNIFICANT CHANGE UP (ref 0–41)
BASOPHILS # BLD AUTO: 0.03 K/UL — SIGNIFICANT CHANGE UP (ref 0–0.2)
BASOPHILS NFR BLD AUTO: 0.4 % — SIGNIFICANT CHANGE UP (ref 0–1)
BILIRUB SERPL-MCNC: 0.8 MG/DL — SIGNIFICANT CHANGE UP (ref 0.2–1.2)
BLD GP AB SCN SERPL QL: SIGNIFICANT CHANGE UP
BUN SERPL-MCNC: 14 MG/DL — SIGNIFICANT CHANGE UP (ref 10–20)
BUN SERPL-MCNC: 15 MG/DL — SIGNIFICANT CHANGE UP (ref 10–20)
CALCIUM SERPL-MCNC: 8.5 MG/DL — SIGNIFICANT CHANGE UP (ref 8.5–10.1)
CALCIUM SERPL-MCNC: 8.9 MG/DL — SIGNIFICANT CHANGE UP (ref 8.5–10.1)
CHLORIDE SERPL-SCNC: 101 MMOL/L — SIGNIFICANT CHANGE UP (ref 98–110)
CHLORIDE SERPL-SCNC: 105 MMOL/L — SIGNIFICANT CHANGE UP (ref 98–110)
CO2 SERPL-SCNC: 24 MMOL/L — SIGNIFICANT CHANGE UP (ref 17–32)
CO2 SERPL-SCNC: 25 MMOL/L — SIGNIFICANT CHANGE UP (ref 17–32)
CREAT SERPL-MCNC: 0.6 MG/DL — LOW (ref 0.7–1.5)
CREAT SERPL-MCNC: 0.6 MG/DL — LOW (ref 0.7–1.5)
EOSINOPHIL # BLD AUTO: 0.18 K/UL — SIGNIFICANT CHANGE UP (ref 0–0.7)
EOSINOPHIL NFR BLD AUTO: 2.1 % — SIGNIFICANT CHANGE UP (ref 0–8)
GLUCOSE BLDC GLUCOMTR-MCNC: 124 MG/DL — HIGH (ref 70–99)
GLUCOSE BLDC GLUCOMTR-MCNC: 260 MG/DL — HIGH (ref 70–99)
GLUCOSE SERPL-MCNC: 146 MG/DL — HIGH (ref 70–99)
GLUCOSE SERPL-MCNC: 317 MG/DL — HIGH (ref 70–99)
HCT VFR BLD CALC: 27.1 % — LOW (ref 37–47)
HCT VFR BLD CALC: 27.3 % — LOW (ref 37–47)
HGB BLD-MCNC: 8.3 G/DL — LOW (ref 12–16)
HGB BLD-MCNC: 8.7 G/DL — LOW (ref 12–16)
IMM GRANULOCYTES NFR BLD AUTO: 0.4 % — HIGH (ref 0.1–0.3)
INR BLD: 1.24 RATIO — SIGNIFICANT CHANGE UP (ref 0.65–1.3)
LYMPHOCYTES # BLD AUTO: 1.04 K/UL — LOW (ref 1.2–3.4)
LYMPHOCYTES # BLD AUTO: 12.3 % — LOW (ref 20.5–51.1)
MAGNESIUM SERPL-MCNC: 1.4 MG/DL — LOW (ref 1.8–2.4)
MAGNESIUM SERPL-MCNC: 1.6 MG/DL — LOW (ref 1.8–2.4)
MCHC RBC-ENTMCNC: 26.4 PG — LOW (ref 27–31)
MCHC RBC-ENTMCNC: 27.1 PG — SIGNIFICANT CHANGE UP (ref 27–31)
MCHC RBC-ENTMCNC: 30.4 G/DL — LOW (ref 32–37)
MCHC RBC-ENTMCNC: 32.1 G/DL — SIGNIFICANT CHANGE UP (ref 32–37)
MCV RBC AUTO: 84.4 FL — SIGNIFICANT CHANGE UP (ref 81–99)
MCV RBC AUTO: 86.9 FL — SIGNIFICANT CHANGE UP (ref 81–99)
MONOCYTES # BLD AUTO: 0.59 K/UL — SIGNIFICANT CHANGE UP (ref 0.1–0.6)
MONOCYTES NFR BLD AUTO: 7 % — SIGNIFICANT CHANGE UP (ref 1.7–9.3)
NEUTROPHILS # BLD AUTO: 6.56 K/UL — HIGH (ref 1.4–6.5)
NEUTROPHILS NFR BLD AUTO: 77.8 % — HIGH (ref 42.2–75.2)
NRBC # BLD: 0 /100 WBCS — SIGNIFICANT CHANGE UP (ref 0–0)
NRBC # BLD: 0 /100 WBCS — SIGNIFICANT CHANGE UP (ref 0–0)
PLATELET # BLD AUTO: 343 K/UL — SIGNIFICANT CHANGE UP (ref 130–400)
PLATELET # BLD AUTO: 392 K/UL — SIGNIFICANT CHANGE UP (ref 130–400)
POTASSIUM SERPL-MCNC: 3.7 MMOL/L — SIGNIFICANT CHANGE UP (ref 3.5–5)
POTASSIUM SERPL-MCNC: 3.9 MMOL/L — SIGNIFICANT CHANGE UP (ref 3.5–5)
POTASSIUM SERPL-SCNC: 3.7 MMOL/L — SIGNIFICANT CHANGE UP (ref 3.5–5)
POTASSIUM SERPL-SCNC: 3.9 MMOL/L — SIGNIFICANT CHANGE UP (ref 3.5–5)
PROT SERPL-MCNC: 6 G/DL — SIGNIFICANT CHANGE UP (ref 6–8)
PROTHROM AB SERPL-ACNC: 14.3 SEC — HIGH (ref 9.95–12.87)
RBC # BLD: 3.14 M/UL — LOW (ref 4.2–5.4)
RBC # BLD: 3.21 M/UL — LOW (ref 4.2–5.4)
RBC # FLD: 13.9 % — SIGNIFICANT CHANGE UP (ref 11.5–14.5)
RBC # FLD: 14 % — SIGNIFICANT CHANGE UP (ref 11.5–14.5)
SARS-COV-2 RNA SPEC QL NAA+PROBE: SIGNIFICANT CHANGE UP
SODIUM SERPL-SCNC: 139 MMOL/L — SIGNIFICANT CHANGE UP (ref 135–146)
SODIUM SERPL-SCNC: 142 MMOL/L — SIGNIFICANT CHANGE UP (ref 135–146)
WBC # BLD: 11.34 K/UL — HIGH (ref 4.8–10.8)
WBC # BLD: 8.43 K/UL — SIGNIFICANT CHANGE UP (ref 4.8–10.8)
WBC # FLD AUTO: 11.34 K/UL — HIGH (ref 4.8–10.8)
WBC # FLD AUTO: 8.43 K/UL — SIGNIFICANT CHANGE UP (ref 4.8–10.8)

## 2020-09-06 PROCEDURE — 99223 1ST HOSP IP/OBS HIGH 75: CPT | Mod: AI

## 2020-09-06 PROCEDURE — 93010 ELECTROCARDIOGRAM REPORT: CPT

## 2020-09-06 RX ORDER — CITALOPRAM 10 MG/1
0 TABLET, FILM COATED ORAL
Qty: 0 | Refills: 0 | DISCHARGE

## 2020-09-06 RX ORDER — ALPRAZOLAM 0.25 MG
0.5 TABLET ORAL AT BEDTIME
Refills: 0 | Status: DISCONTINUED | OUTPATIENT
Start: 2020-09-06 | End: 2020-09-07

## 2020-09-06 RX ORDER — INSULIN LISPRO 100/ML
3 VIAL (ML) SUBCUTANEOUS ONCE
Refills: 0 | Status: COMPLETED | OUTPATIENT
Start: 2020-09-06 | End: 2020-09-06

## 2020-09-06 RX ORDER — MORPHINE SULFATE 50 MG/1
2 CAPSULE, EXTENDED RELEASE ORAL EVERY 6 HOURS
Refills: 0 | Status: DISCONTINUED | OUTPATIENT
Start: 2020-09-06 | End: 2020-09-07

## 2020-09-06 RX ORDER — SIMVASTATIN 20 MG/1
20 TABLET, FILM COATED ORAL AT BEDTIME
Refills: 0 | Status: DISCONTINUED | OUTPATIENT
Start: 2020-09-06 | End: 2020-09-07

## 2020-09-06 RX ORDER — MAGNESIUM SULFATE 500 MG/ML
2 VIAL (ML) INJECTION ONCE
Refills: 0 | Status: COMPLETED | OUTPATIENT
Start: 2020-09-06 | End: 2020-09-06

## 2020-09-06 RX ORDER — CHLORHEXIDINE GLUCONATE 213 G/1000ML
1 SOLUTION TOPICAL
Refills: 0 | Status: DISCONTINUED | OUTPATIENT
Start: 2020-09-06 | End: 2020-09-07

## 2020-09-06 RX ORDER — QUETIAPINE FUMARATE 200 MG/1
0.5 TABLET, FILM COATED ORAL
Qty: 0 | Refills: 0 | DISCHARGE

## 2020-09-06 RX ORDER — ENOXAPARIN SODIUM 100 MG/ML
40 INJECTION SUBCUTANEOUS AT BEDTIME
Refills: 0 | Status: DISCONTINUED | OUTPATIENT
Start: 2020-09-06 | End: 2020-09-07

## 2020-09-06 RX ORDER — ACETAMINOPHEN 500 MG
650 TABLET ORAL ONCE
Refills: 0 | Status: COMPLETED | OUTPATIENT
Start: 2020-09-06 | End: 2020-09-06

## 2020-09-06 RX ORDER — QUETIAPINE FUMARATE 200 MG/1
25 TABLET, FILM COATED ORAL ONCE
Refills: 0 | Status: DISCONTINUED | OUTPATIENT
Start: 2020-09-06 | End: 2020-09-07

## 2020-09-06 RX ORDER — PANTOPRAZOLE SODIUM 20 MG/1
40 TABLET, DELAYED RELEASE ORAL
Refills: 0 | Status: DISCONTINUED | OUTPATIENT
Start: 2020-09-06 | End: 2020-09-07

## 2020-09-06 RX ORDER — OXYCODONE AND ACETAMINOPHEN 5; 325 MG/1; MG/1
1 TABLET ORAL ONCE
Refills: 0 | Status: DISCONTINUED | OUTPATIENT
Start: 2020-09-06 | End: 2020-09-06

## 2020-09-06 RX ORDER — DONEPEZIL HYDROCHLORIDE 10 MG/1
10 TABLET, FILM COATED ORAL AT BEDTIME
Refills: 0 | Status: DISCONTINUED | OUTPATIENT
Start: 2020-09-06 | End: 2020-09-07

## 2020-09-06 RX ADMIN — Medication 650 MILLIGRAM(S): at 19:35

## 2020-09-06 RX ADMIN — OXYCODONE AND ACETAMINOPHEN 1 TABLET(S): 5; 325 TABLET ORAL at 02:40

## 2020-09-06 RX ADMIN — DONEPEZIL HYDROCHLORIDE 10 MILLIGRAM(S): 10 TABLET, FILM COATED ORAL at 21:49

## 2020-09-06 RX ADMIN — Medication 25 GRAM(S): at 23:00

## 2020-09-06 RX ADMIN — Medication 3 UNIT(S): at 23:00

## 2020-09-06 RX ADMIN — OXYCODONE AND ACETAMINOPHEN 1 TABLET(S): 5; 325 TABLET ORAL at 02:09

## 2020-09-06 RX ADMIN — ENOXAPARIN SODIUM 40 MILLIGRAM(S): 100 INJECTION SUBCUTANEOUS at 21:28

## 2020-09-06 RX ADMIN — MORPHINE SULFATE 2 MILLIGRAM(S): 50 CAPSULE, EXTENDED RELEASE ORAL at 13:24

## 2020-09-06 RX ADMIN — MORPHINE SULFATE 2 MILLIGRAM(S): 50 CAPSULE, EXTENDED RELEASE ORAL at 13:48

## 2020-09-06 RX ADMIN — MORPHINE SULFATE 2 MILLIGRAM(S): 50 CAPSULE, EXTENDED RELEASE ORAL at 18:45

## 2020-09-06 RX ADMIN — Medication 650 MILLIGRAM(S): at 18:57

## 2020-09-06 RX ADMIN — SIMVASTATIN 20 MILLIGRAM(S): 20 TABLET, FILM COATED ORAL at 21:49

## 2020-09-06 RX ADMIN — Medication 0.5 MILLIGRAM(S): at 21:28

## 2020-09-06 RX ADMIN — MORPHINE SULFATE 2 MILLIGRAM(S): 50 CAPSULE, EXTENDED RELEASE ORAL at 19:00

## 2020-09-06 NOTE — H&P ADULT - NSHPLABSRESULTS_GEN_ALL_CORE
8.3    10.01 )-----------( 336      ( 05 Sep 2020 15:21 )             26.6       09-05    141  |  105  |  19  ----------------------------<  107<H>  5.1<H>   |  24  |  0.7    Ca    8.2<L>      05 Sep 2020 15:21    TPro  6.0  /  Alb  3.4<L>  /  TBili  0.2  /  DBili  x   /  AST  24  /  ALT  9   /  AlkPhos  66  09-05              CAPILLARY BLOOD GLUCOSE

## 2020-09-06 NOTE — PROGRESS NOTE ADULT - SUBJECTIVE AND OBJECTIVE BOX
Patient will require medical clearance for possible R hip CRPP and possible L hip ORIF.  Above procedures will go depending upon MRI results    Will require medical clearance/optimization prior to OR

## 2020-09-06 NOTE — CHART NOTE - NSCHARTNOTEFT_GEN_A_CORE
Patient is waiting for MRI, less likely now to get procedure done today, starting diet and keeping NPO @ [facility] and as per ortho starting chemo + mechanical dvt prophylaxis

## 2020-09-06 NOTE — H&P ADULT - NSHPPHYSICALEXAM_GEN_ALL_CORE
GENERAL: NAD, lying in bed comfortably, confused, was not speaking in english AO1  HEAD:  Atraumatic, Normocephalic  EYES: EOMI, PERRLA, conjunctiva and sclera clear  ENT: Moist mucous membranes  NECK: Supple, No JVD  CHEST/LUNG: Clear to auscultation bilaterally; No rales, rhonchi, wheezing, or rubs.   HEART: Regular rate and rhythm; No murmurs, rubs, or gallops  ABDOMEN: Bowel sounds present; Soft, Nontender, Nondistended.  EXTREMITIES:  2+ Peripheral Pulses, brisk capillary refill. No clubbing, cyanosis, or edema, unable to asses lower due to pain   NERVOUS SYSTEM:  Alert & Oriented 1, speech clear.  SKIN: No rashes or lesions

## 2020-09-06 NOTE — H&P ADULT - ATTENDING COMMENTS
HPI:  89 year old F coming from home, hx of DM, DLD, breast ca s/p rt mastectomy/chemo presenting for poss L hip fx. As per daughter pt had witnesses fall out of bed x 2 days ago onto L hip. No head trauma/loc. STs went to [facility] ED yesterday and was called back for L hip fx. Pt is mostly wheelchair bound but can walk with cane. She has otherwise been acting at baseline since fall. No decreased po intake, vomiting, worsening confusion (baseline confused AO1), cough, fever, uri symptoms, urinary or GI sx.    In the ED, Hg 8.3 with baseline 11.8 from January 2019,  Acute comminuted fracture of the left greater trochanter with 3 cm displacement superiorly. Associated hematoma at the fracture site measuring 3.3 x 2.0 cm, ortho consulted and following (06 Sep 2020 00:13)    REVIEW OF SYSTEMS: see cc/HPI  CONSTITUTIONAL: No weakness, fevers or chills  EYES/ENT: No visual changes;  No vertigo or throat pain   NECK: No pain or stiffness  RESPIRATORY: No cough, wheezing, hemoptysis; No shortness of breath  CARDIOVASCULAR: No chest pain or palpitations  GASTROINTESTINAL: No abdominal or epigastric pain. No nausea, vomiting, or hematemesis; No diarrhea or constipation. No melena or hematochezia.  GENITOURINARY: No dysuria, frequency or hematuria  NEUROLOGICAL: No numbness or weakness  SKIN: No itching, rashes    Physical Exam:  General: WN/WD NAD  Neurology: A&Ox3, nonfocal, follows commands  Eyes: PERRLA/ EOMI  ENT/Neck: Neck supple, trachea midline, No JVD  Respiratory: CTA B/L, No wheezing, rales, rhonchi  CV: Normal rate regular rhythm, S1S2, (+) LOLIS  II/VI, NO rubs or gallops  Abdominal: Soft, NT, ND +BS,   Extremities: No edema, + peripheral pulses  Skin: No Rashes, Hematoma, Ecchymosis  Incisions: n/a  Tubes: n/a    A/p  L hip fracture - 3 cm superiorly displaced   -NWB  -MRI of the hip   -PRN pain Rx  -Patient has limited mobility and LOLIS on PE - will get 2D echo to assess EF and r/o severe AS  -Ortho follow up      Acute anemia - possibly related to the fracture   -serial CBC   -F/U MRI findings    DM type II   -F/S monitoring and Lantus/ Lispro if > 180 or NPO   -c/w outpatient Rx for now    Dementia / Alzheimer's type   -c/w SSRI, Donepezil and Seroquel     DVT prophylaxis

## 2020-09-06 NOTE — H&P ADULT - ASSESSMENT
89 year old F coming from home, hx of DM, DLD, breast ca s/p rt mastectomy/chemo presenting for poss L hip fx    #L greater trochanter fracture without obvious IT extension  -f/u lower ext duplex  -NWB BLE for now until MRI  -MRI pelvis to rule out left IT extension or R side injury  - Ortho following     #DM  #DLD  #breast cancer s/p rt mastectomy/ chemo outpatient follow up      Activity NWB BLE for now until MRI   Diet regular npo for now  DVT PPX scd  GI ppx ppi  Dispo from home   Code full 89 year old F coming from home, hx of DM, DLD, breast ca s/p rt mastectomy/chemo presenting for poss L hip fx    #L greater trochanter fracture without obvious IT extension  -f/u lower ext duplex  -NWB BLE for now until MRI  -MRI pelvis to rule out left IT extension or R side injury  - Ortho following     #normocytic anemia Hg 8.3 with baseline 11.8 from January 2019 with hematoma at the fracture site measuring 3.3 x 2.0 cm, trend Hg, active t&S  #DM  #DLD  #breast cancer s/p rt mastectomy/ chemo outpatient follow up      Activity NWB BLE for now until MRI   Diet regular npo for now  DVT PPX scd  GI ppx ppi  Dispo from home   Code full 89 year old F coming from home, hx of DM, DLD, breast ca s/p rt mastectomy/chemo presenting for L greater trochanter Fx     #L greater trochanter fracture without obvious IT extension  -f/u lower ext duplex  -NWB BLE for now until MRI  -MRI pelvis to rule out left IT extension or R side injury  -pain medication as needed  - Ortho following     #normocytic anemia Hg 8.3 with baseline 11.8 from January 2019  -hematoma at the fracture site measuring 3.3 x 2.0 cm,  -trend Hg  active t&S    #DM reconcile meds in the AM  #DLD reconcile meds  #breast cancer s/p rt mastectomy/ chemo outpatient follow up  #alzheimer AO1 at baseline    Activity NWB BLE for now until MRI   Diet regular npo for now  DVT PPX scd  GI ppx ppi  Dispo from home   Code full    Please reconcile medication in the AM with daughter Lilliana

## 2020-09-06 NOTE — CHART NOTE - NSCHARTNOTEFT_GEN_A_CORE
Patient seen and examined. Patient is comfortable, no pain at rest. Daughter at bedside. F/u MRI pelvis, ortho f/u regarding surgical fixation.   #Hypomagnesemia : replete

## 2020-09-06 NOTE — H&P ADULT - HISTORY OF PRESENT ILLNESS
89 year old F coming from home, hx of DM, DLD, breast ca s/p rt mastectomy/chemo presenting for poss L hip fx. As per daughter pt had witnesses fall out of bed x 2 days ago onto L hip. No head trauma/loc. STs went to [facility] ED yesterday and was called back for L hip fx. Pt is mostly wheelchair bound. She has otherwise been acting at baseline since fall. No decreased po intake, vomiting, worsening confusion, cough, fever, uri symptoms    In the ED, Hg 8.3 with baseline 11.8 from January,  Acute comminuted fracture of the left greater trochanter with 3 cm displacement superiorly. Associated hematoma at the fracture site measuring 3.3 x 2.0 cm, ortho consulted and following 89 year old F coming from home, hx of DM, DLD, breast ca s/p rt mastectomy/chemo presenting for poss L hip fx. As per daughter pt had witnesses fall out of bed x 2 days ago onto L hip. No head trauma/loc. STs went to [facility] ED yesterday and was called back for L hip fx. Pt is mostly wheelchair bound. She has otherwise been acting at baseline since fall. No decreased po intake, vomiting, worsening confusion, cough, fever, uri symptoms    In the ED, Hg 8.3 with baseline 11.8 from January 2019,  Acute comminuted fracture of the left greater trochanter with 3 cm displacement superiorly. Associated hematoma at the fracture site measuring 3.3 x 2.0 cm, ortho consulted and following 89 year old F coming from home, hx of DM, DLD, breast ca s/p rt mastectomy/chemo presenting for poss L hip fx. As per daughter pt had witnesses fall out of bed x 2 days ago onto L hip. No head trauma/loc. STs went to [facility] ED yesterday and was called back for L hip fx. Pt is mostly wheelchair bound but can walk with cane. She has otherwise been acting at baseline since fall. No decreased po intake, vomiting, worsening confusion (baseline confused AO1), cough, fever, uri symptoms, urinary or GI sx.    In the ED, Hg 8.3 with baseline 11.8 from January 2019,  Acute comminuted fracture of the left greater trochanter with 3 cm displacement superiorly. Associated hematoma at the fracture site measuring 3.3 x 2.0 cm, ortho consulted and following

## 2020-09-07 LAB
ALBUMIN SERPL ELPH-MCNC: 3.2 G/DL — LOW (ref 3.5–5.2)
ALP SERPL-CCNC: 64 U/L — SIGNIFICANT CHANGE UP (ref 30–115)
ALT FLD-CCNC: 7 U/L — SIGNIFICANT CHANGE UP (ref 0–41)
ANION GAP SERPL CALC-SCNC: 14 MMOL/L — SIGNIFICANT CHANGE UP (ref 7–14)
APTT BLD: 27.2 SEC — SIGNIFICANT CHANGE UP (ref 27–39.2)
AST SERPL-CCNC: 14 U/L — SIGNIFICANT CHANGE UP (ref 0–41)
BASOPHILS # BLD AUTO: 0.02 K/UL — SIGNIFICANT CHANGE UP (ref 0–0.2)
BASOPHILS NFR BLD AUTO: 0.2 % — SIGNIFICANT CHANGE UP (ref 0–1)
BILIRUB SERPL-MCNC: 0.6 MG/DL — SIGNIFICANT CHANGE UP (ref 0.2–1.2)
BUN SERPL-MCNC: 13 MG/DL — SIGNIFICANT CHANGE UP (ref 10–20)
CALCIUM SERPL-MCNC: 8.6 MG/DL — SIGNIFICANT CHANGE UP (ref 8.5–10.1)
CHLORIDE SERPL-SCNC: 104 MMOL/L — SIGNIFICANT CHANGE UP (ref 98–110)
CO2 SERPL-SCNC: 23 MMOL/L — SIGNIFICANT CHANGE UP (ref 17–32)
CREAT SERPL-MCNC: 0.5 MG/DL — LOW (ref 0.7–1.5)
EOSINOPHIL # BLD AUTO: 0.08 K/UL — SIGNIFICANT CHANGE UP (ref 0–0.7)
EOSINOPHIL NFR BLD AUTO: 0.9 % — SIGNIFICANT CHANGE UP (ref 0–8)
GLUCOSE BLDC GLUCOMTR-MCNC: 155 MG/DL — HIGH (ref 70–99)
GLUCOSE BLDC GLUCOMTR-MCNC: 172 MG/DL — HIGH (ref 70–99)
GLUCOSE BLDC GLUCOMTR-MCNC: 183 MG/DL — HIGH (ref 70–99)
GLUCOSE SERPL-MCNC: 182 MG/DL — HIGH (ref 70–99)
HCT VFR BLD CALC: 25.9 % — LOW (ref 37–47)
HGB BLD-MCNC: 8.1 G/DL — LOW (ref 12–16)
IMM GRANULOCYTES NFR BLD AUTO: 0.5 % — HIGH (ref 0.1–0.3)
INR BLD: 1.21 RATIO — SIGNIFICANT CHANGE UP (ref 0.65–1.3)
LYMPHOCYTES # BLD AUTO: 0.81 K/UL — LOW (ref 1.2–3.4)
LYMPHOCYTES # BLD AUTO: 9.4 % — LOW (ref 20.5–51.1)
MAGNESIUM SERPL-MCNC: 2 MG/DL — SIGNIFICANT CHANGE UP (ref 1.8–2.4)
MCHC RBC-ENTMCNC: 26.4 PG — LOW (ref 27–31)
MCHC RBC-ENTMCNC: 31.3 G/DL — LOW (ref 32–37)
MCV RBC AUTO: 84.4 FL — SIGNIFICANT CHANGE UP (ref 81–99)
MONOCYTES # BLD AUTO: 0.67 K/UL — HIGH (ref 0.1–0.6)
MONOCYTES NFR BLD AUTO: 7.7 % — SIGNIFICANT CHANGE UP (ref 1.7–9.3)
NEUTROPHILS # BLD AUTO: 7.03 K/UL — HIGH (ref 1.4–6.5)
NEUTROPHILS NFR BLD AUTO: 81.3 % — HIGH (ref 42.2–75.2)
NRBC # BLD: 0 /100 WBCS — SIGNIFICANT CHANGE UP (ref 0–0)
PLATELET # BLD AUTO: 359 K/UL — SIGNIFICANT CHANGE UP (ref 130–400)
POTASSIUM SERPL-MCNC: 3.8 MMOL/L — SIGNIFICANT CHANGE UP (ref 3.5–5)
POTASSIUM SERPL-SCNC: 3.8 MMOL/L — SIGNIFICANT CHANGE UP (ref 3.5–5)
PROT SERPL-MCNC: 5.7 G/DL — LOW (ref 6–8)
PROTHROM AB SERPL-ACNC: 13.9 SEC — HIGH (ref 9.95–12.87)
RBC # BLD: 3.07 M/UL — LOW (ref 4.2–5.4)
RBC # FLD: 13.8 % — SIGNIFICANT CHANGE UP (ref 11.5–14.5)
SARS-COV-2 IGG SERPL QL IA: NEGATIVE — SIGNIFICANT CHANGE UP
SARS-COV-2 IGM SERPL IA-ACNC: 0.08 INDEX — SIGNIFICANT CHANGE UP
SODIUM SERPL-SCNC: 141 MMOL/L — SIGNIFICANT CHANGE UP (ref 135–146)
WBC # BLD: 8.65 K/UL — SIGNIFICANT CHANGE UP (ref 4.8–10.8)
WBC # FLD AUTO: 8.65 K/UL — SIGNIFICANT CHANGE UP (ref 4.8–10.8)

## 2020-09-07 PROCEDURE — 93306 TTE W/DOPPLER COMPLETE: CPT | Mod: 26

## 2020-09-07 PROCEDURE — 72195 MRI PELVIS W/O DYE: CPT | Mod: 26

## 2020-09-07 PROCEDURE — 71045 X-RAY EXAM CHEST 1 VIEW: CPT | Mod: 26

## 2020-09-07 PROCEDURE — 99232 SBSQ HOSP IP/OBS MODERATE 35: CPT

## 2020-09-07 RX ORDER — MORPHINE SULFATE 50 MG/1
2 CAPSULE, EXTENDED RELEASE ORAL EVERY 6 HOURS
Refills: 0 | Status: DISCONTINUED | OUTPATIENT
Start: 2020-09-07 | End: 2020-09-11

## 2020-09-07 RX ORDER — ONDANSETRON 8 MG/1
4 TABLET, FILM COATED ORAL ONCE
Refills: 0 | Status: DISCONTINUED | OUTPATIENT
Start: 2020-09-07 | End: 2020-09-07

## 2020-09-07 RX ORDER — ENOXAPARIN SODIUM 100 MG/ML
40 INJECTION SUBCUTANEOUS AT BEDTIME
Refills: 0 | Status: DISCONTINUED | OUTPATIENT
Start: 2020-09-07 | End: 2020-09-11

## 2020-09-07 RX ORDER — HYDROMORPHONE HYDROCHLORIDE 2 MG/ML
0.2 INJECTION INTRAMUSCULAR; INTRAVENOUS; SUBCUTANEOUS
Refills: 0 | Status: DISCONTINUED | OUTPATIENT
Start: 2020-09-07 | End: 2020-09-07

## 2020-09-07 RX ORDER — SIMVASTATIN 20 MG/1
20 TABLET, FILM COATED ORAL AT BEDTIME
Refills: 0 | Status: DISCONTINUED | OUTPATIENT
Start: 2020-09-07 | End: 2020-09-11

## 2020-09-07 RX ORDER — CEFAZOLIN SODIUM 1 G
2000 VIAL (EA) INJECTION EVERY 8 HOURS
Refills: 0 | Status: COMPLETED | OUTPATIENT
Start: 2020-09-07 | End: 2020-09-08

## 2020-09-07 RX ORDER — ALPRAZOLAM 0.25 MG
0.5 TABLET ORAL AT BEDTIME
Refills: 0 | Status: DISCONTINUED | OUTPATIENT
Start: 2020-09-07 | End: 2020-09-10

## 2020-09-07 RX ORDER — PANTOPRAZOLE SODIUM 20 MG/1
40 TABLET, DELAYED RELEASE ORAL
Refills: 0 | Status: DISCONTINUED | OUTPATIENT
Start: 2020-09-07 | End: 2020-09-11

## 2020-09-07 RX ORDER — DONEPEZIL HYDROCHLORIDE 10 MG/1
10 TABLET, FILM COATED ORAL AT BEDTIME
Refills: 0 | Status: DISCONTINUED | OUTPATIENT
Start: 2020-09-07 | End: 2020-09-11

## 2020-09-07 RX ADMIN — DONEPEZIL HYDROCHLORIDE 10 MILLIGRAM(S): 10 TABLET, FILM COATED ORAL at 22:07

## 2020-09-07 RX ADMIN — Medication 100 MILLIGRAM(S): at 22:24

## 2020-09-07 RX ADMIN — ENOXAPARIN SODIUM 40 MILLIGRAM(S): 100 INJECTION SUBCUTANEOUS at 22:07

## 2020-09-07 RX ADMIN — HYDROMORPHONE HYDROCHLORIDE 0.2 MILLIGRAM(S): 2 INJECTION INTRAMUSCULAR; INTRAVENOUS; SUBCUTANEOUS at 19:29

## 2020-09-07 RX ADMIN — SIMVASTATIN 20 MILLIGRAM(S): 20 TABLET, FILM COATED ORAL at 22:07

## 2020-09-07 RX ADMIN — CHLORHEXIDINE GLUCONATE 1 APPLICATION(S): 213 SOLUTION TOPICAL at 05:35

## 2020-09-07 RX ADMIN — HYDROMORPHONE HYDROCHLORIDE 0.2 MILLIGRAM(S): 2 INJECTION INTRAMUSCULAR; INTRAVENOUS; SUBCUTANEOUS at 20:00

## 2020-09-07 RX ADMIN — Medication 0.5 MILLIGRAM(S): at 22:07

## 2020-09-07 NOTE — PROGRESS NOTE ADULT - ASSESSMENT
89 year old F coming from home, hx of DM, DLD, breast ca s/p rt mastectomy/chemo presenting for L greater trochanter Fx     #L greater trochanter fracture without obvious IT extension  -f/u lower ext duplex  -NWB BLE for now, went for MRI today  -MRI pelvis to rule out left IT extension or R side injury  -pain medication as needed  -Ortho following, plan for OR today     #normocytic anemia Hg 8.3 with baseline 11.8 from January 2019  -hematoma at the fracture site measuring 3.3 x 2.0 cm,  -Hb 8.1, stable today.  -trend Hg  active t&S    #DM reconcile meds in the AM  #DLD reconcile meds  #breast cancer s/p rt mastectomy/ chemo outpatient follow up  #alzheimer AO1 at baseline    Activity NWB BLE for now  Diet npo for now  DVT PPX scd  GI ppx ppi  Dispo from home   Code full    EKG showing LBBB, poor functional status, ECHO performed today (pending reading). Pt is high cardiac risk for moderate risk procedure.    Please reconcile medication in the AM with daughter Lilliana 89 year old F coming from home, hx of DM, DLD, breast ca s/p rt mastectomy/chemo presenting for L greater trochanter Fx     #L greater trochanter fracture without obvious IT extension  -f/u lower ext duplex  -NWB BLE for now, went for MRI today  -MRI pelvis to rule out left IT extension or R side injury  -pain medication as needed  -Ortho following, plan for OR today     #normocytic anemia Hg 8.3 with baseline 11.8 from January 2019  -hematoma at the fracture site measuring 3.3 x 2.0 cm,  -Hb 8.1, stable today.  -trend Hg  active t&S    #DM reconcile meds in the AM  #DLD reconcile meds  #breast cancer s/p rt mastectomy/ chemo outpatient follow up  #alzheimer AO1 at baseline    Activity NWB BLE for now  Diet npo for now  DVT PPX scd  GI ppx ppi  Dispo from home   Code full    EKG showing LBBB, poor functional status, ECHO performed today (pending reading). Pt is high cardiac risk for moderate risk procedure. 89 year old F coming from home, hx of DM, DLD, breast ca s/p rt mastectomy/chemo presenting for L greater trochanter Fx     #L greater trochanter fracture without obvious IT extension  -NWB BLE for now, went for MRI today to rule out left IT extension or R side injury  -pain medication as needed  -Ortho following, plan for OR today     #normocytic anemia, chronic  -hematoma at the fracture site measuring 3.3 x 2.0 cm,  -Hb stable   -trend Hg  -active t&S    #DM II   #DLD   #breast cancer s/p rt mastectomy/ chemo outpatient follow up  #alzheimer AO1 at baseline    Activity NWB BLE for now  Diet npo for now  DVT PPX scd  GI ppx ppi  Dispo from home   Code full

## 2020-09-07 NOTE — PROGRESS NOTE ADULT - ASSESSMENT
ORTHOPEDIC POST OP CHECK      POD0 s/p L hip IMN and R proximal femur CRPP.  S/e at bedside. Resting comfortably in bed. No acute events since transfer from OR/PACU. Remains confused.    T(C): 36.4 (09-07-20 @ 19:30), Max: 37.8 (09-07-20 @ 08:40)  HR: 86 (09-07-20 @ 20:00) (69 - 86)  BP: 167/82 (09-07-20 @ 20:00) (129/87 - 176/72)  RR: 20 (09-07-20 @ 20:00) (15 - 21)  SpO2: 99% (09-07-20 @ 20:00) (94% - 100%)    Gen: awake alert NAD    RLE:   dressing c/d/i  Exam limited by mental status  No significant grimacing with palpation of dressings  compartments soft and compressible, no pain with passive stretch of toes  2+ dp, digits wwp    LLE:  dressing c/d/i  Exam limited by mental status  No significant grimacing with palpation of dressings  compartments soft and compressible, no pain with passive stretch of toes  2+ dp, digits wwp    A/P 89yFemale POD0 s/p L hip IMN and R proximal femur CRPP, doing well  - WBAT BLE  - SCDs/DVT PPx per primary team  - pain control  - postop antibiotics 24h  - IS encouraged  - AM labs  - PT/rehab  - Dispo planning/rest of care per primary  - Please page orthopaedics with questions/concerns

## 2020-09-07 NOTE — BRIEF OPERATIVE NOTE - NSICDXBRIEFPROCEDURE_GEN_ALL_CORE_FT
PROCEDURES:  Percutaneous pinning of right femur 07-Sep-2020 17:49:46  Marc Rogers  Intramedullary nailing of left femur 07-Sep-2020 17:49:27  Marc Rogers

## 2020-09-07 NOTE — CHART NOTE - NSCHARTNOTEFT_GEN_A_CORE
PACU ANESTHESIA ADMISSION NOTE      Procedure:   Post op diagnosis:      ____  Intubated  TV:______       Rate: ______      FiO2: ______    _X___  Patent Airway    __X__  Full return of protective reflexes  X  ____  Full recovery from anesthesia / back to baseline     Vitals:   T:   97.4        R:    16              BP:    129/87              Sat: 96                 P: 76      Mental Status:  _X___ Awake   _____ Alert   _____ Drowsy   _____ Sedated baseline AMS A/oX1    Nausea/Vomiting:  __X__ NO  ______Yes,   See Post - Op Orders          Pain Scale (0-10):  __0___    Treatment: ____ None    ____ See Post - Op/PCA Orders    Post - Operative Fluids:   ____ Oral   ____ See Post - Op Orders  LR 800ml    Plan: Discharge:   ____Home       __X___Floor     _____Critical Care    _____  Other:_________________    Comments: VSS, NAD noted.

## 2020-09-07 NOTE — BRIEF OPERATIVE NOTE - NSICDXBRIEFPREOP_GEN_ALL_CORE_FT
PRE-OP DIAGNOSIS:  Fracture of femoral neck, right 07-Sep-2020 17:50:40  Marc Rogers  Trochanteric fracture of left femur 07-Sep-2020 17:50:17  Marc Rogers

## 2020-09-07 NOTE — PROGRESS NOTE ADULT - SUBJECTIVE AND OBJECTIVE BOX
Ortho Progress Note    Seen and examined this AM. Resting comfortably. No acute events overnight    Phys Ex  Physical exam limited due to patient mental status     Vital Signs Last 24 Hrs  T(C): 37.4 (06 Sep 2020 16:43), Max: 37.4 (06 Sep 2020 16:43)  T(F): 99.4 (06 Sep 2020 16:43), Max: 99.4 (06 Sep 2020 16:43)  HR: 86 (06 Sep 2020 16:43) (78 - 87)  BP: 139/92 (06 Sep 2020 16:43) (139/92 - 156/75)  BP(mean): --  RR: 18 (06 Sep 2020 16:43) (17 - 18)  SpO2: 94% (06 Sep 2020 15:37) (94% - 96%)    NAD  breathing comfortably on RA    B/L LE  Skin intact  No significant pain/grimacing with palpation of b/l hips  Equivocal log roll/axial load b/l hips   Motor grossly intact  wwp       A/P: 89F w/ R [place] 1 Femoral neck fracture of unknown chronicity, L GT fracture    -please obtain MRI pelvis w/out contrast to assess chronicity of R hip and to assess if L GT fracture extends into the intertrochanteric area  -bedrest for now  -SCDs/DVT ppx per primary team  -pain control  -maintain NPO  -medicine clearance  -plan for R hip CRPP, possible L hip ORIF depending on MRI results

## 2020-09-07 NOTE — BRIEF OPERATIVE NOTE - NSICDXBRIEFPOSTOP_GEN_ALL_CORE_FT
POST-OP DIAGNOSIS:  Hip fracture, right 07-Sep-2020 17:51:24  Marc Rogers  Trochanteric fracture of left femur 07-Sep-2020 17:51:05  Marc Rogers

## 2020-09-07 NOTE — PROGRESS NOTE ADULT - SUBJECTIVE AND OBJECTIVE BOX
SUBJECTIVE:    Patient is a 89y old Female who presents with a chief complaint of L greater trochanter fracture (07 Sep 2020 06:25)    Currently admitted to medicine with the primary diagnosis of Greater trochanter fracture     Today is hospital day 2d. This morning she is resting comfortably in bed and reports no new issues or overnight events.     Admit Diagnosis:  GREATER TROCHANTER FRACTURE      PAST MEDICAL & SURGICAL HISTORY  Breast CA  Depression  Diabetes  Hypertension  H/O mastectomy, right    SOCIAL HISTORY:  Negative for smoking/alcohol/drug use.     ALLERGIES:  aspirin (Unknown)    MEDICATIONS:  STANDING MEDICATIONS  ALPRAZolam 0.5 milliGRAM(s) Oral at bedtime  chlorhexidine 4% Liquid 1 Application(s) Topical <User Schedule>  donepezil 10 milliGRAM(s) Oral at bedtime  enoxaparin Injectable 40 milliGRAM(s) SubCutaneous at bedtime  pantoprazole    Tablet 40 milliGRAM(s) Oral before breakfast  QUEtiapine 25 milliGRAM(s) Oral once  simvastatin 20 milliGRAM(s) Oral at bedtime    PRN MEDICATIONS  morphine  - Injectable 2 milliGRAM(s) IV Push every 6 hours PRN    VITALS:   T(F): 100  HR: 82  BP: 139/63  RR: 18  SpO2: 94%    I&Os:    PHYSICAL EXAM:  GEN: No acute distress  LUNGS: Clear to auscultation bilaterally   HEART: S1/S2 present. RRR.   ABD: Soft, NT/ND. BS +  EXT: NC/NC/NE/2+PP/MOORE  NEURO: AAOX3    LABS:                        8.1    8.65  )-----------( 359      ( 07 Sep 2020 05:06 )             25.9     09-07    141  |  104  |  13  ----------------------------<  182<H>  3.8   |  23  |  0.5<L>    Ca    8.6      07 Sep 2020 05:06  Mg     2.0     09-07    TPro  5.7<L>  /  Alb  3.2<L>  /  TBili  0.6  /  DBili  x   /  AST  14  /  ALT  7   /  AlkPhos  64  09-07    PT/INR - ( 07 Sep 2020 05:06 )   PT: 13.90 sec;   INR: 1.21 ratio         PTT - ( 07 Sep 2020 05:06 )  PTT:27.2 sec                  RADIOLOGY: SUBJECTIVE:    Patient is a 89y old Female who presents with a chief complaint of L greater trochanter fracture (07 Sep 2020 06:25)    Currently admitted to medicine with the primary diagnosis of Greater trochanter fracture     Today is hospital day 2d. This morning she initially went for ECHO earlier this morning, now in MRI. Will try to check on her again today. spiked 100.3 F temperature overnight.    Admit Diagnosis:  GREATER TROCHANTER FRACTURE      PAST MEDICAL & SURGICAL HISTORY  Breast CA  Depression  Diabetes  Hypertension  H/O mastectomy, right    SOCIAL HISTORY:  Negative for smoking/alcohol/drug use.     ALLERGIES:  aspirin (Unknown)    MEDICATIONS:  STANDING MEDICATIONS  ALPRAZolam 0.5 milliGRAM(s) Oral at bedtime  chlorhexidine 4% Liquid 1 Application(s) Topical <User Schedule>  donepezil 10 milliGRAM(s) Oral at bedtime  enoxaparin Injectable 40 milliGRAM(s) SubCutaneous at bedtime  pantoprazole    Tablet 40 milliGRAM(s) Oral before breakfast  QUEtiapine 25 milliGRAM(s) Oral once  simvastatin 20 milliGRAM(s) Oral at bedtime    PRN MEDICATIONS  morphine  - Injectable 2 milliGRAM(s) IV Push every 6 hours PRN    VITALS:   T(F): 100  HR: 82  BP: 139/63  RR: 18  SpO2: 94%    I&Os:    PHYSICAL EXAM:  Unable to examine pt, because pt was not present in room twice.  Will try to check on pt again.    LABS:                        8.1    8.65  )-----------( 359      ( 07 Sep 2020 05:06 )             25.9     09-07    141  |  104  |  13  ----------------------------<  182<H>  3.8   |  23  |  0.5<L>    Ca    8.6      07 Sep 2020 05:06  Mg     2.0     09-07    TPro  5.7<L>  /  Alb  3.2<L>  /  TBili  0.6  /  DBili  x   /  AST  14  /  ALT  7   /  AlkPhos  64  09-07    PT/INR - ( 07 Sep 2020 05:06 )   PT: 13.90 sec;   INR: 1.21 ratio      PTT - ( 07 Sep 2020 05:06 )  PTT:27.2 sec    RADIOLOGY:  < from: [facility] Duplex Lower Ext Vein Scan, Bilat (09.05.20 @ 22:55) >  Impression:    No evidence of deep venous thrombosis or superficial thrombophlebitis in the bilateral lower extremities.    < end of copied text >    < from: Xray Femur 2 Views, Left (09.05.20 @ 21:56) >  impression:    There is a fracture involving the base of the greater trochanter with approximately 4 cm of superior diastases. No other fractures are identified. Moderate degenerative changes of the left hip are noted. There are moderate to severe tricompartmental degenerative changes of the left knee.    < end of copied text >    < from: CT Pelvis No Cont (09.05.20 @ 17:41) >  Impression:    Acute comminuted fracture of the left greater trochanter with 3 cm displacement superiorly. Associated hematoma at the fracture site measuring 3.3 x 2.0 cm.    Diffuse osteopenia.    Moderate osteoarthritis of the bilateral hips.    Small bilateral hip joint effusions.    < end of copied text >    < from: CT Head No Cont (09.05.20 @ 17:17) >  Impression  No acute process seen    < end of copied text > SUBJECTIVE:    Patient is a 89y old Female who presents with a chief complaint of L greater trochanter fracture (07 Sep 2020 06:25)    Currently admitted to medicine with the primary diagnosis of Greater trochanter fracture     Today is hospital day 2d. This morning she initially went for ECHO earlier this morning, now in MRI. Will try to check on her again today. spiked 100.3 F temperature overnight.    Admit Diagnosis:  GREATER TROCHANTER FRACTURE      PAST MEDICAL & SURGICAL HISTORY  Breast CA  Depression  Diabetes  Hypertension  H/O mastectomy, right    SOCIAL HISTORY:  Negative for smoking/alcohol/drug use.     ALLERGIES:  aspirin (Unknown)    MEDICATIONS:  STANDING MEDICATIONS  ALPRAZolam 0.5 milliGRAM(s) Oral at bedtime  chlorhexidine 4% Liquid 1 Application(s) Topical <User Schedule>  donepezil 10 milliGRAM(s) Oral at bedtime  enoxaparin Injectable 40 milliGRAM(s) SubCutaneous at bedtime  pantoprazole    Tablet 40 milliGRAM(s) Oral before breakfast  QUEtiapine 25 milliGRAM(s) Oral once  simvastatin 20 milliGRAM(s) Oral at bedtime    PRN MEDICATIONS  morphine  - Injectable 2 milliGRAM(s) IV Push every 6 hours PRN    VITALS:   T(F): 100  HR: 82  BP: 139/63  RR: 18  SpO2: 94%    I&Os:    PHYSICAL EXAM:  Unable to examine pt, because pt was not present in room twice.  Will try to check on pt again.    LABS:                        8.1    8.65  )-----------( 359      ( 07 Sep 2020 05:06 )             25.9     09-07    141  |  104  |  13  ----------------------------<  182<H>  3.8   |  23  |  0.5<L>    Ca    8.6      07 Sep 2020 05:06  Mg     2.0     09-07    TPro  5.7<L>  /  Alb  3.2<L>  /  TBili  0.6  /  DBili  x   /  AST  14  /  ALT  7   /  AlkPhos  64  09-07    PT/INR - ( 07 Sep 2020 05:06 )   PT: 13.90 sec;   INR: 1.21 ratio      PTT - ( 07 Sep 2020 05:06 )  PTT:27.2 sec    < from: 12 Lead ECG (09.06.20 @ 05:10) >  Diagnosis Line Normal sinus rhythm  Left bundle branch block  Abnormal ECG    < end of copied text >    RADIOLOGY:    < from: [facility] Duplex Lower Ext Vein Scan, Bilat (09.05.20 @ 22:55) >  Impression:    No evidence of deep venous thrombosis or superficial thrombophlebitis in the bilateral lower extremities.    < end of copied text >    < from: Xray Femur 2 Views, Left (09.05.20 @ 21:56) >  impression:    There is a fracture involving the base of the greater trochanter with approximately 4 cm of superior diastases. No other fractures are identified. Moderate degenerative changes of the left hip are noted. There are moderate to severe tricompartmental degenerative changes of the left knee.    < end of copied text >    < from: CT Pelvis No Cont (09.05.20 @ 17:41) >  Impression:    Acute comminuted fracture of the left greater trochanter with 3 cm displacement superiorly. Associated hematoma at the fracture site measuring 3.3 x 2.0 cm.    Diffuse osteopenia.    Moderate osteoarthritis of the bilateral hips.    Small bilateral hip joint effusions.    < end of copied text >    < from: CT Head No Cont (09.05.20 @ 17:17) >  Impression  No acute process seen    < end of copied text >

## 2020-09-08 LAB
ANION GAP SERPL CALC-SCNC: 12 MMOL/L — SIGNIFICANT CHANGE UP (ref 7–14)
ANION GAP SERPL CALC-SCNC: 15 MMOL/L — HIGH (ref 7–14)
B-OH-BUTYR SERPL-SCNC: <0.2 MMOL/L — SIGNIFICANT CHANGE UP
BASOPHILS # BLD AUTO: 0.02 K/UL — SIGNIFICANT CHANGE UP (ref 0–0.2)
BASOPHILS # BLD AUTO: 0.02 K/UL — SIGNIFICANT CHANGE UP (ref 0–0.2)
BASOPHILS NFR BLD AUTO: 0.2 % — SIGNIFICANT CHANGE UP (ref 0–1)
BASOPHILS NFR BLD AUTO: 0.2 % — SIGNIFICANT CHANGE UP (ref 0–1)
BUN SERPL-MCNC: 14 MG/DL — SIGNIFICANT CHANGE UP (ref 10–20)
BUN SERPL-MCNC: 19 MG/DL — SIGNIFICANT CHANGE UP (ref 10–20)
CALCIUM SERPL-MCNC: 8 MG/DL — LOW (ref 8.5–10.1)
CALCIUM SERPL-MCNC: 8.1 MG/DL — LOW (ref 8.5–10.1)
CHLORIDE SERPL-SCNC: 102 MMOL/L — SIGNIFICANT CHANGE UP (ref 98–110)
CHLORIDE SERPL-SCNC: 103 MMOL/L — SIGNIFICANT CHANGE UP (ref 98–110)
CO2 SERPL-SCNC: 20 MMOL/L — SIGNIFICANT CHANGE UP (ref 17–32)
CO2 SERPL-SCNC: 26 MMOL/L — SIGNIFICANT CHANGE UP (ref 17–32)
CREAT SERPL-MCNC: 0.7 MG/DL — SIGNIFICANT CHANGE UP (ref 0.7–1.5)
CREAT SERPL-MCNC: 0.8 MG/DL — SIGNIFICANT CHANGE UP (ref 0.7–1.5)
EOSINOPHIL # BLD AUTO: 0 K/UL — SIGNIFICANT CHANGE UP (ref 0–0.7)
EOSINOPHIL # BLD AUTO: 0.01 K/UL — SIGNIFICANT CHANGE UP (ref 0–0.7)
EOSINOPHIL NFR BLD AUTO: 0 % — SIGNIFICANT CHANGE UP (ref 0–8)
EOSINOPHIL NFR BLD AUTO: 0.1 % — SIGNIFICANT CHANGE UP (ref 0–8)
GLUCOSE BLDC GLUCOMTR-MCNC: 134 MG/DL — HIGH (ref 70–99)
GLUCOSE BLDC GLUCOMTR-MCNC: 241 MG/DL — HIGH (ref 70–99)
GLUCOSE BLDC GLUCOMTR-MCNC: 297 MG/DL — HIGH (ref 70–99)
GLUCOSE BLDC GLUCOMTR-MCNC: 366 MG/DL — HIGH (ref 70–99)
GLUCOSE BLDC GLUCOMTR-MCNC: 408 MG/DL — HIGH (ref 70–99)
GLUCOSE BLDC GLUCOMTR-MCNC: 444 MG/DL — HIGH (ref 70–99)
GLUCOSE SERPL-MCNC: 240 MG/DL — HIGH (ref 70–99)
GLUCOSE SERPL-MCNC: 309 MG/DL — HIGH (ref 70–99)
HCT VFR BLD CALC: 24.3 % — LOW (ref 37–47)
HCT VFR BLD CALC: 25.5 % — LOW (ref 37–47)
HGB BLD-MCNC: 7.6 G/DL — LOW (ref 12–16)
HGB BLD-MCNC: 7.8 G/DL — LOW (ref 12–16)
IMM GRANULOCYTES NFR BLD AUTO: 0.4 % — HIGH (ref 0.1–0.3)
IMM GRANULOCYTES NFR BLD AUTO: 0.6 % — HIGH (ref 0.1–0.3)
LYMPHOCYTES # BLD AUTO: 0.55 K/UL — LOW (ref 1.2–3.4)
LYMPHOCYTES # BLD AUTO: 0.68 K/UL — LOW (ref 1.2–3.4)
LYMPHOCYTES # BLD AUTO: 6.4 % — LOW (ref 20.5–51.1)
LYMPHOCYTES # BLD AUTO: 8 % — LOW (ref 20.5–51.1)
MCHC RBC-ENTMCNC: 26 PG — LOW (ref 27–31)
MCHC RBC-ENTMCNC: 26.5 PG — LOW (ref 27–31)
MCHC RBC-ENTMCNC: 30.6 G/DL — LOW (ref 32–37)
MCHC RBC-ENTMCNC: 31.3 G/DL — LOW (ref 32–37)
MCV RBC AUTO: 84.7 FL — SIGNIFICANT CHANGE UP (ref 81–99)
MCV RBC AUTO: 85 FL — SIGNIFICANT CHANGE UP (ref 81–99)
MONOCYTES # BLD AUTO: 0.76 K/UL — HIGH (ref 0.1–0.6)
MONOCYTES # BLD AUTO: 0.89 K/UL — HIGH (ref 0.1–0.6)
MONOCYTES NFR BLD AUTO: 10.4 % — HIGH (ref 1.7–9.3)
MONOCYTES NFR BLD AUTO: 8.8 % — SIGNIFICANT CHANGE UP (ref 1.7–9.3)
NEUTROPHILS # BLD AUTO: 6.91 K/UL — HIGH (ref 1.4–6.5)
NEUTROPHILS # BLD AUTO: 7.27 K/UL — HIGH (ref 1.4–6.5)
NEUTROPHILS NFR BLD AUTO: 80.9 % — HIGH (ref 42.2–75.2)
NEUTROPHILS NFR BLD AUTO: 84 % — HIGH (ref 42.2–75.2)
NRBC # BLD: 0 /100 WBCS — SIGNIFICANT CHANGE UP (ref 0–0)
NRBC # BLD: 0 /100 WBCS — SIGNIFICANT CHANGE UP (ref 0–0)
OSMOLALITY SERPL: 299 MOS/KG — SIGNIFICANT CHANGE UP (ref 280–301)
PLATELET # BLD AUTO: 349 K/UL — SIGNIFICANT CHANGE UP (ref 130–400)
PLATELET # BLD AUTO: 356 K/UL — SIGNIFICANT CHANGE UP (ref 130–400)
POTASSIUM SERPL-MCNC: 3.6 MMOL/L — SIGNIFICANT CHANGE UP (ref 3.5–5)
POTASSIUM SERPL-MCNC: 3.7 MMOL/L — SIGNIFICANT CHANGE UP (ref 3.5–5)
POTASSIUM SERPL-SCNC: 3.6 MMOL/L — SIGNIFICANT CHANGE UP (ref 3.5–5)
POTASSIUM SERPL-SCNC: 3.7 MMOL/L — SIGNIFICANT CHANGE UP (ref 3.5–5)
RBC # BLD: 2.87 M/UL — LOW (ref 4.2–5.4)
RBC # BLD: 3 M/UL — LOW (ref 4.2–5.4)
RBC # FLD: 13.9 % — SIGNIFICANT CHANGE UP (ref 11.5–14.5)
RBC # FLD: 13.9 % — SIGNIFICANT CHANGE UP (ref 11.5–14.5)
SODIUM SERPL-SCNC: 138 MMOL/L — SIGNIFICANT CHANGE UP (ref 135–146)
SODIUM SERPL-SCNC: 140 MMOL/L — SIGNIFICANT CHANGE UP (ref 135–146)
WBC # BLD: 8.54 K/UL — SIGNIFICANT CHANGE UP (ref 4.8–10.8)
WBC # BLD: 8.65 K/UL — SIGNIFICANT CHANGE UP (ref 4.8–10.8)
WBC # FLD AUTO: 8.54 K/UL — SIGNIFICANT CHANGE UP (ref 4.8–10.8)
WBC # FLD AUTO: 8.65 K/UL — SIGNIFICANT CHANGE UP (ref 4.8–10.8)

## 2020-09-08 PROCEDURE — 99233 SBSQ HOSP IP/OBS HIGH 50: CPT

## 2020-09-08 RX ORDER — INSULIN LISPRO 100/ML
4 VIAL (ML) SUBCUTANEOUS
Refills: 0 | Status: DISCONTINUED | OUTPATIENT
Start: 2020-09-08 | End: 2020-09-11

## 2020-09-08 RX ORDER — DIPHENOXYLATE HCL/ATROPINE 2.5-.025MG
2 TABLET ORAL
Qty: 0 | Refills: 0 | DISCHARGE

## 2020-09-08 RX ORDER — INSULIN LISPRO 100/ML
VIAL (ML) SUBCUTANEOUS
Refills: 0 | Status: DISCONTINUED | OUTPATIENT
Start: 2020-09-08 | End: 2020-09-11

## 2020-09-08 RX ORDER — DEXTROSE 50 % IN WATER 50 %
12.5 SYRINGE (ML) INTRAVENOUS ONCE
Refills: 0 | Status: DISCONTINUED | OUTPATIENT
Start: 2020-09-08 | End: 2020-09-11

## 2020-09-08 RX ORDER — DEXTROSE 50 % IN WATER 50 %
15 SYRINGE (ML) INTRAVENOUS ONCE
Refills: 0 | Status: DISCONTINUED | OUTPATIENT
Start: 2020-09-08 | End: 2020-09-11

## 2020-09-08 RX ORDER — METFORMIN HYDROCHLORIDE 850 MG/1
1 TABLET ORAL
Qty: 0 | Refills: 0 | DISCHARGE

## 2020-09-08 RX ORDER — INSULIN GLARGINE 100 [IU]/ML
12 INJECTION, SOLUTION SUBCUTANEOUS AT BEDTIME
Refills: 0 | Status: DISCONTINUED | OUTPATIENT
Start: 2020-09-08 | End: 2020-09-11

## 2020-09-08 RX ORDER — SIMVASTATIN 20 MG/1
1 TABLET, FILM COATED ORAL
Qty: 0 | Refills: 0 | DISCHARGE

## 2020-09-08 RX ORDER — DEXTROSE 50 % IN WATER 50 %
25 SYRINGE (ML) INTRAVENOUS ONCE
Refills: 0 | Status: DISCONTINUED | OUTPATIENT
Start: 2020-09-08 | End: 2020-09-11

## 2020-09-08 RX ORDER — MEMANTINE HYDROCHLORIDE 10 MG/1
1 TABLET ORAL
Qty: 0 | Refills: 0 | DISCHARGE

## 2020-09-08 RX ORDER — GLUCAGON INJECTION, SOLUTION 0.5 MG/.1ML
1 INJECTION, SOLUTION SUBCUTANEOUS ONCE
Refills: 0 | Status: DISCONTINUED | OUTPATIENT
Start: 2020-09-08 | End: 2020-09-11

## 2020-09-08 RX ORDER — DONEPEZIL HYDROCHLORIDE 10 MG/1
1 TABLET, FILM COATED ORAL
Qty: 0 | Refills: 0 | DISCHARGE

## 2020-09-08 RX ORDER — MEMANTINE HYDROCHLORIDE 10 MG/1
10 TABLET ORAL
Refills: 0 | Status: DISCONTINUED | OUTPATIENT
Start: 2020-09-08 | End: 2020-09-11

## 2020-09-08 RX ORDER — SODIUM CHLORIDE 9 MG/ML
1000 INJECTION, SOLUTION INTRAVENOUS
Refills: 0 | Status: DISCONTINUED | OUTPATIENT
Start: 2020-09-08 | End: 2020-09-08

## 2020-09-08 RX ORDER — INSULIN HUMAN 100 [IU]/ML
10 INJECTION, SOLUTION SUBCUTANEOUS ONCE
Refills: 0 | Status: COMPLETED | OUTPATIENT
Start: 2020-09-08 | End: 2020-09-08

## 2020-09-08 RX ADMIN — Medication 100 MILLIGRAM(S): at 05:03

## 2020-09-08 RX ADMIN — INSULIN GLARGINE 12 UNIT(S): 100 INJECTION, SOLUTION SUBCUTANEOUS at 21:55

## 2020-09-08 RX ADMIN — MEMANTINE HYDROCHLORIDE 10 MILLIGRAM(S): 10 TABLET ORAL at 05:03

## 2020-09-08 RX ADMIN — INSULIN HUMAN 10 UNIT(S): 100 INJECTION, SOLUTION SUBCUTANEOUS at 14:08

## 2020-09-08 RX ADMIN — DONEPEZIL HYDROCHLORIDE 10 MILLIGRAM(S): 10 TABLET, FILM COATED ORAL at 21:55

## 2020-09-08 RX ADMIN — INSULIN HUMAN 10 UNIT(S): 100 INJECTION, SOLUTION SUBCUTANEOUS at 12:11

## 2020-09-08 RX ADMIN — MORPHINE SULFATE 2 MILLIGRAM(S): 50 CAPSULE, EXTENDED RELEASE ORAL at 06:04

## 2020-09-08 RX ADMIN — MORPHINE SULFATE 2 MILLIGRAM(S): 50 CAPSULE, EXTENDED RELEASE ORAL at 19:52

## 2020-09-08 RX ADMIN — SIMVASTATIN 20 MILLIGRAM(S): 20 TABLET, FILM COATED ORAL at 21:55

## 2020-09-08 RX ADMIN — Medication 0.5 MILLIGRAM(S): at 21:54

## 2020-09-08 RX ADMIN — ENOXAPARIN SODIUM 40 MILLIGRAM(S): 100 INJECTION SUBCUTANEOUS at 21:57

## 2020-09-08 RX ADMIN — MORPHINE SULFATE 2 MILLIGRAM(S): 50 CAPSULE, EXTENDED RELEASE ORAL at 13:02

## 2020-09-08 RX ADMIN — MEMANTINE HYDROCHLORIDE 10 MILLIGRAM(S): 10 TABLET ORAL at 17:40

## 2020-09-08 RX ADMIN — PANTOPRAZOLE SODIUM 40 MILLIGRAM(S): 20 TABLET, DELAYED RELEASE ORAL at 05:03

## 2020-09-08 RX ADMIN — MORPHINE SULFATE 2 MILLIGRAM(S): 50 CAPSULE, EXTENDED RELEASE ORAL at 13:32

## 2020-09-08 RX ADMIN — Medication 6: at 17:44

## 2020-09-08 RX ADMIN — Medication 4 UNIT(S): at 17:44

## 2020-09-08 RX ADMIN — MORPHINE SULFATE 2 MILLIGRAM(S): 50 CAPSULE, EXTENDED RELEASE ORAL at 20:22

## 2020-09-08 RX ADMIN — MORPHINE SULFATE 2 MILLIGRAM(S): 50 CAPSULE, EXTENDED RELEASE ORAL at 06:39

## 2020-09-08 NOTE — PHYSICAL THERAPY INITIAL EVALUATION ADULT - GENERAL OBSERVATIONS, REHAB EVAL
Pt was fast asleep in bed, and could barely open her eyes when PT tried to wake her up. Pt's daughter was present b/s, provided PLOF for the pt. PT will f/u when appropriate.

## 2020-09-08 NOTE — PROGRESS NOTE ADULT - SUBJECTIVE AND OBJECTIVE BOX
SUBJECTIVE:    Patient is a 89y old Female who presents with a chief complaint of L greater trochanter fracture (08 Sep 2020 08:48)    Currently admitted to medicine with the primary diagnosis of Greater trochanter fracture     Today is hospital day 3d. This morning she is resting comfortably in bed and reports no new issues or overnight events.     Admit Diagnosis:  GREATER TROCHANTER FRACTURE      PAST MEDICAL & SURGICAL HISTORY  Systolic heart failure  Breast CA  Depression  Diabetes  Hypertension  H/O mastectomy, right    SOCIAL HISTORY:  Negative for smoking/alcohol/drug use.     ALLERGIES:  aspirin (Unknown)    MEDICATIONS:  STANDING MEDICATIONS  ALPRAZolam 0.5 milliGRAM(s) Oral at bedtime  dextrose 50% Injectable 12.5 Gram(s) IV Push once  dextrose 50% Injectable 25 Gram(s) IV Push once  dextrose 50% Injectable 25 Gram(s) IV Push once  donepezil 10 milliGRAM(s) Oral at bedtime  enoxaparin Injectable 40 milliGRAM(s) SubCutaneous at bedtime  insulin glargine Injectable (LANTUS) 12 Unit(s) SubCutaneous at bedtime  insulin lispro (HumaLOG) corrective regimen sliding scale   SubCutaneous three times a day before meals  insulin lispro Injectable (HumaLOG) 4 Unit(s) SubCutaneous three times a day before meals  insulin regular  human recombinant. 10 Unit(s) IV Push once  memantine 10 milliGRAM(s) Oral two times a day  pantoprazole    Tablet 40 milliGRAM(s) Oral before breakfast  simvastatin 20 milliGRAM(s) Oral at bedtime    PRN MEDICATIONS  dextrose 40% Gel 15 Gram(s) Oral once PRN  glucagon  Injectable 1 milliGRAM(s) IntraMuscular once PRN  morphine  - Injectable 2 milliGRAM(s) IV Push every 6 hours PRN    VITALS:   T(F): 99.7  HR: 87  BP: 100/54  RR: 22  SpO2: 97%    I&Os:  09-07-20 @ 07:01  -  09-08-20 @ 07:00  --------------------------------------------------------  IN: 90 mL / OUT: 0 mL / NET: 90 mL        PHYSICAL EXAM:  GEN: No acute distress  LUNGS: Clear to auscultation bilaterally   HEART: S1/S2 present. RRR.   ABD: Soft, NT/ND. BS +  EXT: NC/NC/NE/2+PP/MOORE  NEURO: AAOX3    LABS:                        7.6    8.65  )-----------( 349      ( 08 Sep 2020 06:37 )             24.3     09-08    138  |  103  |  14  ----------------------------<  240<H>  3.7   |  20  |  0.7    Ca    8.0<L>      08 Sep 2020 06:37  Mg     2.0     09-07    TPro  5.7<L>  /  Alb  3.2<L>  /  TBili  0.6  /  DBili  x   /  AST  14  /  ALT  7   /  AlkPhos  64  09-07    PT/INR - ( 07 Sep 2020 05:06 )   PT: 13.90 sec;   INR: 1.21 ratio       PTT - ( 07 Sep 2020 05:06 )  PTT:27.2 sec      Culture - Blood (collected 06 Sep 2020 06:02)  Source: .Blood None  Preliminary Report (07 Sep 2020 13:01):    No growth to date.        Culture - Blood (collected 09-06-20 @ 06:02)  Source: .Blood None  Preliminary Report (09-07-20 @ 13:01):    No growth to date.      RADIOLOGY:

## 2020-09-08 NOTE — PROGRESS NOTE ADULT - ASSESSMENT
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

## 2020-09-08 NOTE — PHYSICAL THERAPY INITIAL EVALUATION ADULT - SPECIFY REASON(S)
Pt does not have clear wt bearing status orders. PT contacted Dr Vora (Ext [phone]) to update the wt bearing status. PT will f/u to perform IE once has updated wt bearing status.

## 2020-09-08 NOTE — PROGRESS NOTE ADULT - ASSESSMENT
ORTHOPEDIC PROGRESS      POD1 s/p L hip IMN and R proximal femur CRPP.  S/e at bedside. Resting comfortably in bed. No acute events since transfer from OR/PACU.     T(C): 36.4 (09-07-20 @ 19:30), Max: 37.8 (09-07-20 @ 08:40)  HR: 86 (09-07-20 @ 20:00) (69 - 86)  BP: 167/82 (09-07-20 @ 20:00) (129/87 - 176/72)  RR: 20 (09-07-20 @ 20:00) (15 - 21)  SpO2: 99% (09-07-20 @ 20:00) (94% - 100%)    Gen: awake alert NAD    RLE:   dressing c/d/i  Exam limited by mental status  No significant grimacing with palpation of dressings  compartments soft and compressible, no pain with passive stretch of toes  2+ dp, digits wwp    LLE:  dressing c/d/i  Exam limited by mental status  No significant grimacing with palpation of dressings  compartments soft and compressible, no pain with passive stretch of toes  2+ dp, digits wwp                          7.6    8.65  )-----------( 349      ( 08 Sep 2020 06:37 )             24.3       A/P 89yFemale POD1 s/p L hip IMN and R proximal femur CRPP, doing well  - trend CBC, transfuse prn  - WBAT BLE  - SCDs/DVT PPx per primary team  - pain control  - postop antibiotics  - IS encouraged  - AM labs  - PT/rehab  - Dispo planning/rest of care per primary  - Please page orthopaedics with questions/concerns

## 2020-09-08 NOTE — PHYSICAL THERAPY INITIAL EVALUATION ADULT - SPECIFY REASON(S)
PT attempted to see the pt for IE. However pt was fast asleep. Pt's daughter was present b/s. Daughter and the PCA reported that she was sitting in the chair all morning and just went back to bed.

## 2020-09-08 NOTE — OCCUPATIONAL THERAPY INITIAL EVALUATION ADULT - NS ASR FOLLOW COMMAND OT EVAL
unable to follow commands/Unable to understand questions despite multiple attempts via ./unable to answer questions

## 2020-09-08 NOTE — OCCUPATIONAL THERAPY INITIAL EVALUATION ADULT - PERTINENT HX OF CURRENT PROBLEM, REHAB EVAL
89 year old F coming from home, hx of DM, DLD, breast ca s/p rt mastectomy/chemo presenting for poss L hip fx. As per daughter pt had witnesses fall out of bed x 2 days ago onto L hip. No head trauma/loc

## 2020-09-08 NOTE — OCCUPATIONAL THERAPY INITIAL EVALUATION ADULT - GENERAL OBSERVATIONS, REHAB EVAL
Pt encountered in bed in NAD, confused A&Ox1, 1:1 sit present, 2L O2 via NC. Slovak speaking,  #[id number] throughout session. Pt repeated "I don't understand, I am dying". Pt left in b/s chair with Pt Jadyn present.

## 2020-09-09 LAB
A1C WITH ESTIMATED AVERAGE GLUCOSE RESULT: 6.6 % — HIGH (ref 4–5.6)
ALBUMIN SERPL ELPH-MCNC: 2.7 G/DL — LOW (ref 3.5–5.2)
ALP SERPL-CCNC: 55 U/L — SIGNIFICANT CHANGE UP (ref 30–115)
ALT FLD-CCNC: <5 U/L — SIGNIFICANT CHANGE UP (ref 0–41)
ANION GAP SERPL CALC-SCNC: 6 MMOL/L — LOW (ref 7–14)
AST SERPL-CCNC: 11 U/L — SIGNIFICANT CHANGE UP (ref 0–41)
BASOPHILS # BLD AUTO: 0.02 K/UL — SIGNIFICANT CHANGE UP (ref 0–0.2)
BASOPHILS NFR BLD AUTO: 0.2 % — SIGNIFICANT CHANGE UP (ref 0–1)
BILIRUB SERPL-MCNC: 0.2 MG/DL — SIGNIFICANT CHANGE UP (ref 0.2–1.2)
BUN SERPL-MCNC: 20 MG/DL — SIGNIFICANT CHANGE UP (ref 10–20)
CALCIUM SERPL-MCNC: 7.9 MG/DL — LOW (ref 8.5–10.1)
CHLORIDE SERPL-SCNC: 105 MMOL/L — SIGNIFICANT CHANGE UP (ref 98–110)
CO2 SERPL-SCNC: 30 MMOL/L — SIGNIFICANT CHANGE UP (ref 17–32)
CREAT SERPL-MCNC: 0.6 MG/DL — LOW (ref 0.7–1.5)
EOSINOPHIL # BLD AUTO: 0.23 K/UL — SIGNIFICANT CHANGE UP (ref 0–0.7)
EOSINOPHIL NFR BLD AUTO: 2.7 % — SIGNIFICANT CHANGE UP (ref 0–8)
ESTIMATED AVERAGE GLUCOSE: 143 MG/DL — HIGH (ref 68–114)
GLUCOSE BLDC GLUCOMTR-MCNC: 110 MG/DL — HIGH (ref 70–99)
GLUCOSE BLDC GLUCOMTR-MCNC: 225 MG/DL — HIGH (ref 70–99)
GLUCOSE BLDC GLUCOMTR-MCNC: 284 MG/DL — HIGH (ref 70–99)
GLUCOSE BLDC GLUCOMTR-MCNC: 93 MG/DL — SIGNIFICANT CHANGE UP (ref 70–99)
GLUCOSE SERPL-MCNC: 77 MG/DL — SIGNIFICANT CHANGE UP (ref 70–99)
HCT VFR BLD CALC: 24.3 % — LOW (ref 37–47)
HGB BLD-MCNC: 7.5 G/DL — LOW (ref 12–16)
IMM GRANULOCYTES NFR BLD AUTO: 0.4 % — HIGH (ref 0.1–0.3)
LYMPHOCYTES # BLD AUTO: 1.2 K/UL — SIGNIFICANT CHANGE UP (ref 1.2–3.4)
LYMPHOCYTES # BLD AUTO: 14.2 % — LOW (ref 20.5–51.1)
MAGNESIUM SERPL-MCNC: 1.8 MG/DL — SIGNIFICANT CHANGE UP (ref 1.8–2.4)
MCHC RBC-ENTMCNC: 26.4 PG — LOW (ref 27–31)
MCHC RBC-ENTMCNC: 30.9 G/DL — LOW (ref 32–37)
MCV RBC AUTO: 85.6 FL — SIGNIFICANT CHANGE UP (ref 81–99)
MONOCYTES # BLD AUTO: 0.9 K/UL — HIGH (ref 0.1–0.6)
MONOCYTES NFR BLD AUTO: 10.7 % — HIGH (ref 1.7–9.3)
NEUTROPHILS # BLD AUTO: 6.06 K/UL — SIGNIFICANT CHANGE UP (ref 1.4–6.5)
NEUTROPHILS NFR BLD AUTO: 71.8 % — SIGNIFICANT CHANGE UP (ref 42.2–75.2)
NRBC # BLD: 0 /100 WBCS — SIGNIFICANT CHANGE UP (ref 0–0)
PLATELET # BLD AUTO: 333 K/UL — SIGNIFICANT CHANGE UP (ref 130–400)
POTASSIUM SERPL-MCNC: 3.7 MMOL/L — SIGNIFICANT CHANGE UP (ref 3.5–5)
POTASSIUM SERPL-SCNC: 3.7 MMOL/L — SIGNIFICANT CHANGE UP (ref 3.5–5)
PROT SERPL-MCNC: 5.1 G/DL — LOW (ref 6–8)
RBC # BLD: 2.84 M/UL — LOW (ref 4.2–5.4)
RBC # FLD: 13.8 % — SIGNIFICANT CHANGE UP (ref 11.5–14.5)
SODIUM SERPL-SCNC: 141 MMOL/L — SIGNIFICANT CHANGE UP (ref 135–146)
WBC # BLD: 8.44 K/UL — SIGNIFICANT CHANGE UP (ref 4.8–10.8)
WBC # FLD AUTO: 8.44 K/UL — SIGNIFICANT CHANGE UP (ref 4.8–10.8)

## 2020-09-09 PROCEDURE — 99232 SBSQ HOSP IP/OBS MODERATE 35: CPT

## 2020-09-09 RX ADMIN — ENOXAPARIN SODIUM 40 MILLIGRAM(S): 100 INJECTION SUBCUTANEOUS at 21:42

## 2020-09-09 RX ADMIN — Medication 0.5 MILLIGRAM(S): at 21:41

## 2020-09-09 RX ADMIN — Medication 4 UNIT(S): at 11:47

## 2020-09-09 RX ADMIN — Medication 4 UNIT(S): at 18:38

## 2020-09-09 RX ADMIN — MORPHINE SULFATE 2 MILLIGRAM(S): 50 CAPSULE, EXTENDED RELEASE ORAL at 05:45

## 2020-09-09 RX ADMIN — SIMVASTATIN 20 MILLIGRAM(S): 20 TABLET, FILM COATED ORAL at 21:42

## 2020-09-09 RX ADMIN — MEMANTINE HYDROCHLORIDE 10 MILLIGRAM(S): 10 TABLET ORAL at 05:46

## 2020-09-09 RX ADMIN — DONEPEZIL HYDROCHLORIDE 10 MILLIGRAM(S): 10 TABLET, FILM COATED ORAL at 21:42

## 2020-09-09 RX ADMIN — PANTOPRAZOLE SODIUM 40 MILLIGRAM(S): 20 TABLET, DELAYED RELEASE ORAL at 05:46

## 2020-09-09 RX ADMIN — MEMANTINE HYDROCHLORIDE 10 MILLIGRAM(S): 10 TABLET ORAL at 18:36

## 2020-09-09 RX ADMIN — MORPHINE SULFATE 2 MILLIGRAM(S): 50 CAPSULE, EXTENDED RELEASE ORAL at 06:15

## 2020-09-09 RX ADMIN — Medication 4: at 11:46

## 2020-09-09 RX ADMIN — INSULIN GLARGINE 12 UNIT(S): 100 INJECTION, SOLUTION SUBCUTANEOUS at 21:41

## 2020-09-09 NOTE — CONSULT NOTE ADULT - SUBJECTIVE AND OBJECTIVE BOX
HPI:  89 year old F coming from home, hx of DM, DLD, breast ca s/p rt mastectomy/chemo presenting for poss L hip fx. As per daughter pt had witnesses fall out of bed x 2 days ago onto L hip. No head trauma/loc. STs went to [facility] ED yesterday and was called back for L hip fx. Pt is mostly wheelchair bound but can walk with cane. She has otherwise been acting at baseline since fall. No decreased po intake, vomiting, worsening confusion (baseline confused AO1), cough, fever, uri symptoms, urinary or GI sx.    In the ED, Hg 8.3 with baseline 11.8 from January 2019,  Acute comminuted fracture of the left greater trochanter with 3 cm displacement superiorly. Associated hematoma at the fracture site measuring 3.3 x 2.0 cm, ortho consulted and following (06 Sep 2020 00:13). of pelvis showed left hip IT and GT fx, and right hip subcapital femoral neck fx. s/p orif left hip fx and CRPP of right hip fx on 9/7, wbat beth legs per ortho      PAST MEDICAL & SURGICAL HISTORY:  Systolic heart failure  Breast CA  Depression  Diabetes  Hypertension  H/O mastectomy, right      Hospital Course:    TODAY'S SUBJECTIVE & REVIEW OF SYMPTOMS:     Constitutional WNL   Cardio WNL   Resp WNL   GI WNL  Heme WNL  Endo WNL  Skin WNL  MSK WNL  Neuro WNL  Cognitive confused  Psych WNL      MEDICATIONS  (STANDING):  ALPRAZolam 0.5 milliGRAM(s) Oral at bedtime  dextrose 50% Injectable 12.5 Gram(s) IV Push once  dextrose 50% Injectable 25 Gram(s) IV Push once  dextrose 50% Injectable 25 Gram(s) IV Push once  donepezil 10 milliGRAM(s) Oral at bedtime  enoxaparin Injectable 40 milliGRAM(s) SubCutaneous at bedtime  insulin glargine Injectable (LANTUS) 12 Unit(s) SubCutaneous at bedtime  insulin lispro (HumaLOG) corrective regimen sliding scale   SubCutaneous three times a day before meals  insulin lispro Injectable (HumaLOG) 4 Unit(s) SubCutaneous three times a day before meals  memantine 10 milliGRAM(s) Oral two times a day  pantoprazole    Tablet 40 milliGRAM(s) Oral before breakfast  simvastatin 20 milliGRAM(s) Oral at bedtime    MEDICATIONS  (PRN):  dextrose 40% Gel 15 Gram(s) Oral once PRN Blood Glucose LESS THAN 70 milliGRAM(s)/deciliter  glucagon  Injectable 1 milliGRAM(s) IntraMuscular once PRN Glucose LESS THAN 70 milligrams/deciliter  morphine  - Injectable 2 milliGRAM(s) IV Push every 6 hours PRN Severe Pain (7 - 10)      FAMILY HISTORY:  No pertinent family history in first degree relatives      Allergies    aspirin (Unknown)    Intolerances        SOCIAL HISTORY:    [  ] Etoh  [  ] Smoking  [  ] Substance abuse     Home Environment:  [  ] Home Alone  [x  ] Lives with Family  [  ] Home Health Aid    Dwelling:  [  ] Apartment  [ x ] Private House  [  ] Adult Home  [  ] Skilled Nursing Facility      [  ] Short Term  [  ] Long Term  [x  ] Stairs       Elevator [  ]    FUNCTIONAL STATUS PTA: (Check all that apply)  Ambulation: [   ]Independent    [  ] Dependent     [  ] Non-Ambulatory  Assistive Device: [x  ] SA Cane  [  ]  Q Cane  [  ] Walker  [ x ]  Wheelchair  ADL : [  ] Independent  [ x ]  Dependent       Vital Signs Last 24 Hrs  T(C): 37.2 (09 Sep 2020 07:30), Max: 37.6 (08 Sep 2020 10:00)  T(F): 98.9 (09 Sep 2020 07:30), Max: 99.7 (08 Sep 2020 10:00)  HR: 86 (09 Sep 2020 07:30) (80 - 87)  BP: 121/58 (09 Sep 2020 07:30) (98/50 - 140/58)  BP(mean): --  RR: 18 (09 Sep 2020 07:30) (18 - 22)  SpO2: 98% (08 Sep 2020 14:01) (97% - 98%)      PHYSICAL EXAM: Awake & confused  GENERAL: NAD  HEAD:  Normocephalic  CHEST/LUNG: Clear   HEART: S1S2+  ABDOMEN: Soft, Nontender  EXTREMITIES:  no calf tenderness    NERVOUS SYSTEM:  Cranial Nerves 2-12 intact [  ] Abnormal  [  ]  ROM: WFL all extremities [  ]  Abnormal [x  ]limited beth legs  Motor Strength: WFL all extremities  [  ]  Abnormal [ x ]limited beth legs  Sensation: intact to light touch [  ] Abnormal [  ]    FUNCTIONAL STATUS:  Bed Mobility: Independent [  ]  Supervision [  ]  Needs Assistance [ x ]  N/A [  ]  Transfers: Independent [  ]  Supervision [  ]  Needs Assistance [x  ]  N/A [  ]   Ambulation: Independent [  ]  Supervision [  ]  Needs Assistance [  ]  N/A [  ]  ADL: Independent [  ] Requires Assistance [  ] N/A [  ]      LABS:                        7.5    8.44  )-----------( 333      ( 09 Sep 2020 06:23 )             24.3     09-09    141  |  105  |  20  ----------------------------<  77  3.7   |  30  |  0.6<L>    Ca    7.9<L>      09 Sep 2020 06:23  Mg     1.8     09-09    TPro  5.1<L>  /  Alb  2.7<L>  /  TBili  0.2  /  DBili  x   /  AST  11  /  ALT  <5  /  AlkPhos  55  09-09          RADIOLOGY & ADDITIONAL STUDIES:

## 2020-09-09 NOTE — PHYSICAL THERAPY INITIAL EVALUATION ADULT - GENERAL OBSERVATIONS, REHAB EVAL
Pt was rec'd in semi recline in bed, NAD, +2LO2 via NC, needed max VCs and TCs to have the pt participate in PT. Pt speaks Lebanese, Lebanese  ID#[id number] was used during PT session. Pt was seen from 9:00-9:40 am for PT IE. Pt was rec'd in semi recline in bed, NAD, +2LO2 via NC, needed max VCs and TCs to have the pt participate in PT. Pt speaks Malawian, Malawian  ID#[id number] was used during PT session.

## 2020-09-09 NOTE — PROGRESS NOTE ADULT - SUBJECTIVE AND OBJECTIVE BOX
[facility]  89[id number]      Patient is a 89y old  Female who presents with a chief complaint of L greater trochanter fracture (09 Sep 2020 09:42)      INTERVAL HPI/OVERNIGHT EVENTS:    no acute events overnight     REVIEW OF SYSTEMS:  Unable to ROS due to MS   FAMILY HISTORY:  No pertinent family history in first degree relatives    T(C): 36.4 (09-09-20 @ 15:03), Max: 37.2 (09-09-20 @ 07:30)  HR: 74 (09-09-20 @ 15:03) (74 - 86)  BP: 117/56 (09-09-20 @ 15:03) (98/50 - 121/58)  RR: 18 (09-09-20 @ 15:03) (18 - 18)  SpO2: --  Wt(kg): --Vital Signs Last 24 Hrs  T(C): 36.4 (09 Sep 2020 15:03), Max: 37.2 (09 Sep 2020 07:30)  T(F): 97.5 (09 Sep 2020 15:03), Max: 98.9 (09 Sep 2020 07:30)  HR: 74 (09 Sep 2020 15:03) (74 - 86)  BP: 117/56 (09 Sep 2020 15:03) (98/50 - 121/58)  BP(mean): --  RR: 18 (09 Sep 2020 15:03) (18 - 18)  SpO2: --    PHYSICAL EXAM:  GEN Lying in no acute distress  HEENT Pupils equal and reactive to light and accommodationSupple Neck  PULM Clear to auscultation bilaterally  CV s1s2   GI + bowel sounds nontnender  EXT no cyanosis or edema  PSYCH awake alert and oriented x 1  INTEG No Lesions  NEURO Moves all extremities    LABS:                            7.5    8.44  )-----------( 333      ( 09 Sep 2020 06:23 )             24.3   09-09    141  |  105  |  20  ----------------------------<  77  3.7   |  30  |  0.6<L>    Ca    7.9<L>      09 Sep 2020 06:23  Mg     1.8     09-09    TPro  5.1<L>  /  Alb  2.7<L>  /  TBili  0.2  /  DBili  x   /  AST  11  /  ALT  <5  /  AlkPhos  55  09-09            ALPRAZolam 0.5 milliGRAM(s) Oral at bedtime  dextrose 40% Gel 15 Gram(s) Oral once PRN  dextrose 50% Injectable 12.5 Gram(s) IV Push once  dextrose 50% Injectable 25 Gram(s) IV Push once  dextrose 50% Injectable 25 Gram(s) IV Push once  donepezil 10 milliGRAM(s) Oral at bedtime  enoxaparin Injectable 40 milliGRAM(s) SubCutaneous at bedtime  glucagon  Injectable 1 milliGRAM(s) IntraMuscular once PRN  insulin glargine Injectable (LANTUS) 12 Unit(s) SubCutaneous at bedtime  insulin lispro (HumaLOG) corrective regimen sliding scale   SubCutaneous three times a day before meals  insulin lispro Injectable (HumaLOG) 4 Unit(s) SubCutaneous three times a day before meals  memantine 10 milliGRAM(s) Oral two times a day  morphine  - Injectable 2 milliGRAM(s) IV Push every 6 hours PRN  pantoprazole    Tablet 40 milliGRAM(s) Oral before breakfast  simvastatin 20 milliGRAM(s) Oral at bedtime      HEALTH ISSUES - PROBLEM Dx:  Suspected deep vein thrombosis (DVT)          Case Discussed with House Staff   Spectra [phone]

## 2020-09-09 NOTE — PHYSICAL THERAPY INITIAL EVALUATION ADULT - PERTINENT HX OF CURRENT PROBLEM, REHAB EVAL
89 year old F coming from home, hx of DM, DLD, breast ca s/p rt mastectomy/chemo presenting for L hip fx. As per daughter pt had witnesses fall out of bed x 2 days ago onto L hip. No head trauma/loc

## 2020-09-09 NOTE — PHYSICAL THERAPY INITIAL EVALUATION ADULT - AMBULATION SKILLS, REHAB EVAL
needs device/needed assist/Has HHA and needed sc but ptwas non compliant using SC
needed assist/needs device

## 2020-09-09 NOTE — MEDICAL STUDENT PROGRESS NOTE(EDUCATION) - SUBJECTIVE AND OBJECTIVE BOX
Patient is a 89 year old female with PMH of T2DM, DLD, dementia, breast cancer s/p R mastectomy/chemo presenting for L hip fracture. As per daughter, patient had witnesses fall out of bed  days ago onto L hip. No head trauma, LOC. STs went to [place] ED previously and was called back for L hip fracture. At home, patient is mostly wheelchair bound but can walk with cane. She has otherwise been acting at baseline since fall. Denies PO intake, vomiting, worsening confusion (baseline confused AOx1), cough, fever, URI symptoms, urinary or GI sx.    In the ED, Hg 8.3 with baseline 11.8 from January 2019,  Acute comminuted fracture of the left greater trochanter with 3 cm displacement superiorly. Associated hematoma at the fracture site measuring 3.3 x 2.0 cm, ortho consulted and following (06 Sep 2020 00:13)    Currently admitted to medicine with the primary diagnosis of L greater trochanter fracture     Today is hospital day 4d.     INTERVAL HPI / OVERNIGHT EVENTS:  Patient was examined and seen at bedside. This morning she is resting comfortably in bed and reports no new issues or overnight events. Patient is able to eat, urinate, defecate, and slept fine overnight.    ROS: Otherwise unremarkable     PAST MEDICAL & SURGICAL HISTORY  Systolic heart failure  Breast CA  Depression  Diabetes  Hypertension  H/O mastectomy, right    ALLERGIES  aspirin (Unknown)    MEDICATIONS  STANDING MEDICATIONS  ALPRAZolam 0.5 milliGRAM(s) Oral at bedtime  dextrose 50% Injectable 12.5 Gram(s) IV Push once  dextrose 50% Injectable 25 Gram(s) IV Push once  dextrose 50% Injectable 25 Gram(s) IV Push once  donepezil 10 milliGRAM(s) Oral at bedtime  enoxaparin Injectable 40 milliGRAM(s) SubCutaneous at bedtime  insulin glargine Injectable (LANTUS) 12 Unit(s) SubCutaneous at bedtime  insulin lispro (HumaLOG) corrective regimen sliding scale   SubCutaneous three times a day before meals  insulin lispro Injectable (HumaLOG) 4 Unit(s) SubCutaneous three times a day before meals  memantine 10 milliGRAM(s) Oral two times a day  pantoprazole    Tablet 40 milliGRAM(s) Oral before breakfast  simvastatin 20 milliGRAM(s) Oral at bedtime    PRN MEDICATIONS  dextrose 40% Gel 15 Gram(s) Oral once PRN  glucagon  Injectable 1 milliGRAM(s) IntraMuscular once PRN  morphine  - Injectable 2 milliGRAM(s) IV Push every 6 hours PRN    VITALS:  T(F): 98.9  HR: 82  BP: 112/58  RR: 18  SpO2: 98%    PHYSICAL EXAM  GEN: NAD, Resting comfortably in bed  PULM: Clear to auscultation bilaterally, No wheezes  CVS: Regular rate and rhythm, S1-S2, no murmurs  ABD: Soft, non-tender, non-distended, no guarding  EXT: No edema  NEURO: A&Ox3, no focal deficits    LABS             7.5    8.44  )-----------( 333      ( 09 Sep 2020 06:23 )             24.3     09-09  141  |  105  |  20  ----------------------------<  77  3.7   |  30  |  0.6<L>    Ca    7.9<L>      09 Sep 2020 06:23  Mg     1.8     09-09    TPro  5.1<L>  /  Alb  2.7<L>  /  TBili  0.2  /  DBili  x   /  AST  11  /  ALT  <5  /  AlkPhos  55  09-09    RADIOLOGY    EXAM:  XR CHEST PORTABLE ROUTINE 1V          PROCEDURE DATE:  09/07/2020      INTERPRETATION:  Clinical History / Reason for exam: Sepsis  Comparison : Chest radiograph:  9/582020  Technique/Positioning: Frontal view of the chest.  Findings:  Cardiac/mediastinum/hilum: No significant change  Lung parenchyma/Pleura: There is no focal consolidation, pleural effusion or pneumothorax.  Skeleton/soft tissues: Stable    Impression:  No evidence of focal consolidation, pleural effusion or pneumothorax.  New bilateral reticular opacities.    EXAM:  CT PELVIS ONLY          PROCEDURE DATE:  09/05/2020      INTERPRETATION:  Clinical History / Reason for exam: Fall, left hip pain.  Technique:  Multiaxial CT images of the pelvis were obtained.  Coronal and sagittal reformatted images were obtained.  Comparison: Bilateral hip radiographs of the same day.    Findings:  Acute comminuted fracture of the left greater trochanter with approximately 3 cm displacement superiorly. Diffuse osteopenia. Moderate osteoarthritis of the bilateral hips. Degenerative changes of the lower lumbar spine and pubic symphysis. Small bilateral hip joint effusions.  Hemorrhagic density measuring 3.3 x 2.0 cm at the left greater trochanter fracture site, likely represent hematoma. Diffuse anasarca.  Visualized lower abdomen and pelvis unremarkable.  Atherosclerotic vascular calcifications.    Impression:  Acute comminuted fracture of the left greater trochanter with 3 cm displacement superiorly. Associated hematoma at the fracture site measuring 3.3 x 2.0 cm.  Diffuse osteopenia.  Moderate osteoarthritis of the bilateral hips.  Small bilateral hip joint effusions. Patient is a 89 year old female with PMH of T2DM, DLD, dementia, breast cancer s/p R mastectomy/chemo presenting for L hip fracture. As per daughter, patient had witnesses fall out of bed  days ago onto L hip. No head trauma, LOC. STs went to [place] ED previously and was called back for L hip fracture. At home, patient is mostly wheelchair bound but can walk with cane. She has otherwise been acting at baseline since fall. Denies PO intake, vomiting, worsening confusion (baseline confused AOx1), cough, fever, URI symptoms, urinary or GI sx.    In the ED, Hg 8.3 with baseline 11.8 from January 2019,  Acute comminuted fracture of the left greater trochanter with 3 cm displacement superiorly. Associated hematoma at the fracture site measuring 3.3 x 2.0 cm, ortho consulted and following (06 Sep 2020 00:13)    Currently admitted to medicine with the primary diagnosis of L greater trochanter fracture     Today is hospital day 5d.     INTERVAL HPI / OVERNIGHT EVENTS:  Patient was examined and seen at bedside. This morning she is resting comfortably in bed and reports no new issues or overnight events. Patient is able to eat, urinate, defecate, and slept fine overnight.    ROS: Otherwise unremarkable     PAST MEDICAL & SURGICAL HISTORY  Systolic heart failure  Breast CA  Depression  Diabetes  Hypertension  H/O mastectomy, right    ALLERGIES  aspirin (Unknown)    MEDICATIONS  STANDING MEDICATIONS  ALPRAZolam 0.5 milliGRAM(s) Oral at bedtime  dextrose 50% Injectable 12.5 Gram(s) IV Push once  dextrose 50% Injectable 25 Gram(s) IV Push once  dextrose 50% Injectable 25 Gram(s) IV Push once  donepezil 10 milliGRAM(s) Oral at bedtime  enoxaparin Injectable 40 milliGRAM(s) SubCutaneous at bedtime  insulin glargine Injectable (LANTUS) 12 Unit(s) SubCutaneous at bedtime  insulin lispro (HumaLOG) corrective regimen sliding scale   SubCutaneous three times a day before meals  insulin lispro Injectable (HumaLOG) 4 Unit(s) SubCutaneous three times a day before meals  memantine 10 milliGRAM(s) Oral two times a day  pantoprazole    Tablet 40 milliGRAM(s) Oral before breakfast  simvastatin 20 milliGRAM(s) Oral at bedtime    PRN MEDICATIONS  dextrose 40% Gel 15 Gram(s) Oral once PRN  glucagon  Injectable 1 milliGRAM(s) IntraMuscular once PRN  morphine  - Injectable 2 milliGRAM(s) IV Push every 6 hours PRN    VITALS:  T(F): 98.9  HR: 82  BP: 112/58  RR: 18  SpO2: 98%    PHYSICAL EXAM  GEN: NAD, Resting comfortably in bed  PULM: Clear to auscultation bilaterally, No wheezes  CVS: Regular rate and rhythm, S1-S2, no murmurs  ABD: Soft, non-tender, non-distended, no guarding  EXT: No edema  NEURO: A&Ox3, no focal deficits    LABS             7.5    8.44  )-----------( 333      ( 09 Sep 2020 06:23 )             24.3     09-09  141  |  105  |  20  ----------------------------<  77  3.7   |  30  |  0.6<L>    Ca    7.9<L>      09 Sep 2020 06:23  Mg     1.8     09-09    TPro  5.1<L>  /  Alb  2.7<L>  /  TBili  0.2  /  DBili  x   /  AST  11  /  ALT  <5  /  AlkPhos  55  09-09    RADIOLOGY    EXAM:  XR CHEST PORTABLE ROUTINE 1V          PROCEDURE DATE:  09/07/2020      INTERPRETATION:  Clinical History / Reason for exam: Sepsis  Comparison : Chest radiograph:  9/582020  Technique/Positioning: Frontal view of the chest.  Findings:  Cardiac/mediastinum/hilum: No significant change  Lung parenchyma/Pleura: There is no focal consolidation, pleural effusion or pneumothorax.  Skeleton/soft tissues: Stable    Impression:  No evidence of focal consolidation, pleural effusion or pneumothorax.  New bilateral reticular opacities.    EXAM:  CT PELVIS ONLY          PROCEDURE DATE:  09/05/2020      INTERPRETATION:  Clinical History / Reason for exam: Fall, left hip pain.  Technique:  Multiaxial CT images of the pelvis were obtained.  Coronal and sagittal reformatted images were obtained.  Comparison: Bilateral hip radiographs of the same day.    Findings:  Acute comminuted fracture of the left greater trochanter with approximately 3 cm displacement superiorly. Diffuse osteopenia. Moderate osteoarthritis of the bilateral hips. Degenerative changes of the lower lumbar spine and pubic symphysis. Small bilateral hip joint effusions.  Hemorrhagic density measuring 3.3 x 2.0 cm at the left greater trochanter fracture site, likely represent hematoma. Diffuse anasarca.  Visualized lower abdomen and pelvis unremarkable.  Atherosclerotic vascular calcifications.    Impression:  Acute comminuted fracture of the left greater trochanter with 3 cm displacement superiorly. Associated hematoma at the fracture site measuring 3.3 x 2.0 cm.  Diffuse osteopenia.  Moderate osteoarthritis of the bilateral hips.  Small bilateral hip joint effusions. Patient is a 89 year old female with PMH of T2DM, DLD, dementia, breast cancer s/p I&D L hip IMN and R proximal femur CRPP POD #3 presenting for L hip fracture. As per daughter, patient had witnesses fall out of bed onto L hip 2 days prior to admission. No head trauma, LOC. STs went to [place] ED previously and was called back for L hip fracture. At home, patient is mostly wheelchair bound but can walk with cane. She has otherwise been acting at baseline since fall. Denies PO intake, vomiting, worsening confusion (baseline confused AOx1), cough, fever, URI symptoms, urinary or GI sx.    In the ED, Hg 8.3 with baseline 11.8 from January 2019,  Acute comminuted fracture of the left greater trochanter with 3 cm displacement superiorly. Associated hematoma at the fracture site measuring 3.3 x 2.0 cm, ortho consulted and following (06 Sep 2020 00:13)    Currently admitted to medicine with the primary diagnosis of L greater trochanter fracture     Today is hospital day 5d.     INTERVAL HPI / OVERNIGHT EVENTS:  Patient was examined and seen at bedside. This morning she is resting comfortably in bed and reports no new issues or overnight events. Patient is able to eat, urinate, defecate, and slept fine overnight.    ROS: Otherwise unremarkable     PAST MEDICAL & SURGICAL HISTORY  Systolic heart failure  Breast CA  Depression  Diabetes  Hypertension  H/O mastectomy, right    ALLERGIES  aspirin (Unknown)    MEDICATIONS  STANDING MEDICATIONS  ALPRAZolam 0.5 milliGRAM(s) Oral at bedtime  dextrose 50% Injectable 12.5 Gram(s) IV Push once  dextrose 50% Injectable 25 Gram(s) IV Push once  dextrose 50% Injectable 25 Gram(s) IV Push once  donepezil 10 milliGRAM(s) Oral at bedtime  enoxaparin Injectable 40 milliGRAM(s) SubCutaneous at bedtime  insulin glargine Injectable (LANTUS) 12 Unit(s) SubCutaneous at bedtime  insulin lispro (HumaLOG) corrective regimen sliding scale   SubCutaneous three times a day before meals  insulin lispro Injectable (HumaLOG) 4 Unit(s) SubCutaneous three times a day before meals  memantine 10 milliGRAM(s) Oral two times a day  pantoprazole    Tablet 40 milliGRAM(s) Oral before breakfast  simvastatin 20 milliGRAM(s) Oral at bedtime    PRN MEDICATIONS  dextrose 40% Gel 15 Gram(s) Oral once PRN  glucagon  Injectable 1 milliGRAM(s) IntraMuscular once PRN  morphine  - Injectable 2 milliGRAM(s) IV Push every 6 hours PRN    VITALS:  T(F): 98.9  HR: 82  BP: 112/58  RR: 18  SpO2: 98%    PHYSICAL EXAM  GEN: NAD, Resting comfortably in bed  PULM: Clear to auscultation bilaterally, No wheezes  CVS: Regular rate and rhythm, S1-S2, no murmurs  ABD: Soft, non-tender, non-distended, no guarding  EXT: No edema  NEURO: A&Ox3, no focal deficits    LABS             7.5    8.44  )-----------( 333      ( 09 Sep 2020 06:23 )             24.3     09-09  141  |  105  |  20  ----------------------------<  77  3.7   |  30  |  0.6<L>    Ca    7.9<L>      09 Sep 2020 06:23  Mg     1.8     09-09    TPro  5.1<L>  /  Alb  2.7<L>  /  TBili  0.2  /  DBili  x   /  AST  11  /  ALT  <5  /  AlkPhos  55  09-09    RADIOLOGY    EXAM:  XR CHEST PORTABLE ROUTINE 1V          PROCEDURE DATE:  09/07/2020      INTERPRETATION:  Clinical History / Reason for exam: Sepsis  Comparison : Chest radiograph:  9/582020  Technique/Positioning: Frontal view of the chest.  Findings:  Cardiac/mediastinum/hilum: No significant change  Lung parenchyma/Pleura: There is no focal consolidation, pleural effusion or pneumothorax.  Skeleton/soft tissues: Stable    Impression:  No evidence of focal consolidation, pleural effusion or pneumothorax.  New bilateral reticular opacities.    EXAM:  CT PELVIS ONLY          PROCEDURE DATE:  09/05/2020      INTERPRETATION:  Clinical History / Reason for exam: Fall, left hip pain.  Technique:  Multiaxial CT images of the pelvis were obtained.  Coronal and sagittal reformatted images were obtained.  Comparison: Bilateral hip radiographs of the same day.    Findings:  Acute comminuted fracture of the left greater trochanter with approximately 3 cm displacement superiorly. Diffuse osteopenia. Moderate osteoarthritis of the bilateral hips. Degenerative changes of the lower lumbar spine and pubic symphysis. Small bilateral hip joint effusions.  Hemorrhagic density measuring 3.3 x 2.0 cm at the left greater trochanter fracture site, likely represent hematoma. Diffuse anasarca.  Visualized lower abdomen and pelvis unremarkable.  Atherosclerotic vascular calcifications.    Impression:  Acute comminuted fracture of the left greater trochanter with 3 cm displacement superiorly. Associated hematoma at the fracture site measuring 3.3 x 2.0 cm.  Diffuse osteopenia.  Moderate osteoarthritis of the bilateral hips.  Small bilateral hip joint effusions.

## 2020-09-09 NOTE — PROGRESS NOTE ADULT - ASSESSMENT
HPI:  89 year old F coming from home, hx of DM, DLD, breast ca s/p rt mastectomy/chemo presenting for poss L hip fx. As per daughter pt had witnesses fall out of bed x 2 days ago onto L hip. No head trauma/loc. STs went to [facility] ED yesterday and was called back for L hip fx. Pt is mostly wheelchair bound but can walk with cane. She has otherwise been acting at baseline since fall. No decreased po intake, vomiting, worsening confusion (baseline confused AO1), cough, fever, uri symptoms, urinary or GI sx.    #L greater trochanter fracture   sp IMN  9/7       #normocytic anemia, chronic mild decrease , stable     #DM II   POCT Blood Glucose.: 110 mg/dL (09 Sep 2020 17:04)  POCT Blood Glucose.: 225 mg/dL (09 Sep 2020 11:44)  POCT Blood Glucose.: 93 mg/dL (09 Sep 2020 07:46)  POCT Blood Glucose.: 134 mg/dL (08 Sep 2020 21:24)  controlled     #DLD     #breast cancer s/p rt mastectomy/ chemo outpatient follow up    #alzheimer dementia  at baseline , on memantine    Progress Note Handoff    Pending:  DC planning   Family discussion: family aware of plan of care     Disposition: STR

## 2020-09-09 NOTE — MEDICAL STUDENT PROGRESS NOTE(EDUCATION) - NS MD HP STUD ASPLAN ASSES FT
Patient is a 89 year old female with PMH of T2DM, DLD, dementia, breast cancer s/p R mastectomy/chemo s/p L hip IMN & R proximal femur CRPP POD #2 presenting for L hip fracture. Patient is a 89 year old female with PMH of T2DM, DLD, dementia, breast cancer s/p R mastectomy/chemo s/p L hip IMN & R proximal femur CRPP POD #3 presenting for L hip fracture. Patient is a 89 year old female with PMH of T2DM, DLD, dementia, breast cancer s/p L hip IMN & R proximal femur CRPP POD #3 presenting for L hip fracture.

## 2020-09-09 NOTE — MEDICAL STUDENT PROGRESS NOTE(EDUCATION) - NS MD HP STUD ASPLAN PLAN FT
1. L greater trochanter fracture  - hx of fall  - CT abd/pevlis = actue non-displaced L intertrochanteric fx & superiorly displaced greater trochanter avulsion  - s/p L hip IMN & R prox femur CRPP POD#2  - as per ortho: cleared for discharge, transfuse if Hb <7  - as per physiatry: bedside PT/OT, dispo to STR in skilled nursing facility    2. Normocytic anemia  - Hb = 8.3 on admission  - trend Hb  - maintain active T&S    3. T2DM  - glucose today = 77  - continue Lantus 12 U, Humalog 4U, ISS    4. DLD  - continue simvastatin 20 mg    5. Breast cancer s/p mastectomy/chemo outpt  - f/u outpt    6. Alzheimer's disease  - baseline = AOx1  - continue donepezil 10 mg, memantine 10 mg, alprazolam 0.5 mg    7. Healthcare maintenance  - DVT ppx: enoxaparin 40 mg SQ  - GI ppx: pantoprazole 40 mg  - Diet: DASH/TLC, sodium & cholesterol restricted  - Code status: Full code  - Dispo: STR with skilled nursing facility, pending PT jhonal 1. L greater trochanter fracture  - hx of fall  - CT abd/pevlis = actue non-displaced L intertrochanteric fx & superiorly displaced greater trochanter avulsion  - s/p L hip IMN & R prox femur CRPP POD#2  - as per ortho: cleared for discharge, transfuse if Hb <7  - as per physiatry: bedside PT/OT, dispo to STR in skilled nursing facility  percutaneous pinning done by the othropedics team    2. Normocytic anemia  - Hb = 8.3 on admission  - trend Hb  - maintain active T&S    3. T2DM  - glucose today = 77  - continue Lantus 12 U, Humalog 4U, ISS    4. DLD  - continue simvastatin 20 mg    5. Breast cancer s/p mastectomy/chemo outpt  - f/u outpt    6. Alzheimer's disease  - baseline = AOx1  - continue donepezil 10 mg, memantine 10 mg, alprazolam 0.5 mg    7. Healthcare maintenance  - DVT ppx: enoxaparin 40 mg SQ  - GI ppx: pantoprazole 40 mg  - Diet: DASH/TLC, sodium & cholesterol restricted  - Code status: Full code  - Dispo: STR with skilled nursing facility, pending PT jacklyn    I have examined the patient and agree with all what is stated in the note 1. L greater trochanter fracture  - hx of fall  - CT abd/pelvis = actue non-displaced L intertrochanteric fx & superiorly displaced greater trochanter avulsion  - s/p L hip IMN & R prox femur CRPP POD#3  - as per ortho: cleared for discharge, Hb = 6.9 today, transfuse 1U RBC  - as per physiatry: bedside PT/OT, dispo to STR in skilled nursing facility  percutaneous pinning done by the orthopedics team    2. Normocytic anemia  - Hb = 8.3 on admission  - trend Hb  - maintain active T&S    3. T2DM  - glucose today = 77  - continue Lantus 12 U, Humalog 4U, ISS    4. DLD  - continue simvastatin 20 mg    5. Breast cancer s/p mastectomy/chemo  - f/u outpt    6. Alzheimer's disease  - baseline = AOx1  - continue donepezil 10 mg, memantine 10 mg, alprazolam 0.5 mg    7. Healthcare maintenance  - DVT ppx: enoxaparin 40 mg SQ  - GI ppx: pantoprazole 40 mg  - Diet: DASH/TLC, sodium & cholesterol restricted  - Code status: Full code  - Dispo: STR with skilled nursing facility, pending PM labs    I have examined the patient and agree with all what is stated in the note 1. L greater trochanter fracture  - hx of fall  - CT abd/pelvis = actue non-displaced L intertrochanteric fx & superiorly displaced greater trochanter avulsion  - s/p L hip IMN & R prox femur CRPP POD#3  - as per ortho: cleared for discharge, transfuse if Hb <7  - Hb = 6.9 today, transfuse 1U RBC  - as per physiatry: bedside PT/OT, dispo to STR in skilled nursing facility  - transfuse 1U RBC, monitor PM CBC    2. Normocytic anemia  - Hb = 8.3 on admission  - trend Hb  - maintain active T&S    3. T2DM  - glucose today = 77  - continue Lantus 12 U, Humalog 4U, ISS    4. DLD  - continue simvastatin 20 mg    5. Breast cancer s/p mastectomy/chemo  - f/u outpt    6. Alzheimer's disease  - baseline = AOx1  - continue donepezil 10 mg, memantine 10 mg, alprazolam 0.5 mg    7. Healthcare maintenance  - DVT ppx: enoxaparin 40 mg SQ  - GI ppx: pantoprazole 40 mg  - Diet: DASH/TLC, sodium & cholesterol restricted  - Code status: Full code  - Dispo: STR with skilled nursing facility, pending PM labs    I have examined the patient and agree with all what is stated in the note

## 2020-09-09 NOTE — PROGRESS NOTE ADULT - ASSESSMENT
ORTHOPEDIC PROGRESS      POD2 s/p L hip IMN and R proximal femur CRPP.  S/e at bedside. Resting comfortably in bed. No acute events since transfer from OR/PACU.     MEDICATIONS  (STANDING):  ALPRAZolam 0.5 milliGRAM(s) Oral at bedtime  dextrose 50% Injectable 12.5 Gram(s) IV Push once  dextrose 50% Injectable 25 Gram(s) IV Push once  dextrose 50% Injectable 25 Gram(s) IV Push once  donepezil 10 milliGRAM(s) Oral at bedtime  enoxaparin Injectable 40 milliGRAM(s) SubCutaneous at bedtime  insulin glargine Injectable (LANTUS) 12 Unit(s) SubCutaneous at bedtime  insulin lispro (HumaLOG) corrective regimen sliding scale   SubCutaneous three times a day before meals  insulin lispro Injectable (HumaLOG) 4 Unit(s) SubCutaneous three times a day before meals  memantine 10 milliGRAM(s) Oral two times a day  pantoprazole    Tablet 40 milliGRAM(s) Oral before breakfast  simvastatin 20 milliGRAM(s) Oral at bedtime    MEDICATIONS  (PRN):  dextrose 40% Gel 15 Gram(s) Oral once PRN Blood Glucose LESS THAN 70 milliGRAM(s)/deciliter  glucagon  Injectable 1 milliGRAM(s) IntraMuscular once PRN Glucose LESS THAN 70 milligrams/deciliter  morphine  - Injectable 2 milliGRAM(s) IV Push every 6 hours PRN Severe Pain (7 - 10)      Vital Signs Last 24 Hrs  T(C): 35.6 (09 Sep 2020 04:00), Max: 37.6 (08 Sep 2020 10:00)  T(F): 96 (09 Sep 2020 04:00), Max: 99.7 (08 Sep 2020 10:00)  HR: 82 (09 Sep 2020 04:00) (80 - 91)  BP: 112/58 (09 Sep 2020 04:00) (98/50 - 140/58)  BP(mean): --  RR: 18 (09 Sep 2020 04:00) (18 - 22)  SpO2: 98% (08 Sep 2020 14:01) (97% - 98%)    Gen: awake alert NAD    RLE:   dressing c/d/i  Exam limited by mental status  No significant grimacing with palpation of dressings  compartments soft and compressible, no pain with passive stretch of toes  2+ dp, digits wwp    LLE:  dressing c/d/i  Exam limited by mental status  No significant grimacing with palpation of dressings  compartments soft and compressible, no pain with passive stretch of toes  2+ dp, digits wwp                            7.8    8.54  )-----------( 356      ( 08 Sep 2020 11:25 )             25.5     09-08    140  |  102  |  19  ----------------------------<  309<H>  3.6   |  26  |  0.8    Ca    8.1<L>      08 Sep 2020 17:42            09-08-20 @ 07:01  -  09-09-20 @ 07:00  --------------------------------------------------------  IN: 740 mL / OUT: 0 mL / NET: 740 mL        A/P 89yFemale POD2 s/p L hip IMN and R proximal femur CRPP, doing well  - trend CBC, transfuse prn  - WBAT BLE  - SCDs/DVT PPx per primary team  - pain control  - postop antibiotics complete  - IS encouraged  - AM labs  - PT/rehab  - Dispo planning/rest of care per primary  - Please page orthopaedics with questions/concerns

## 2020-09-09 NOTE — CONSULT NOTE ADULT - ASSESSMENT
IMPRESSION: Rehab of veronica hip fx, veronica hip fixations    PRECAUTIONS: [  ] Cardiac  [  ] Respiratory  [  ] Seizures [  ] Contact Isolation  [  ] Droplet Isolation  [  ] Other    Weight Bearing Status: WBAT VERONICA LE    RECOMMENDATION:    Out of Bed to Chair     DVT/Decubiti Prophylaxis    REHAB PLAN:     [x   ] Bedside P/T 3-5 times a week   [x   ]   Bedside O/T  2-3 times a week             [   ] No Rehab Therapy Indicated                   [   ]  Speech Therapy   Conditioning/ROM                                    ADL  Bed Mobility                                               Conditioning/ROM  Transfers                                                     Bed Mobility  Sitting /Standing Balance                         Transfers                                        Gait Training                                               Sitting/Standing Balance  Stair Training [   ]Applicable                    Home equipment Eval                                                                        Splinting  [   ] Only      GOALS:   ADL   [   ]   Independent                    Transfers  [   ] Independent                          Ambulation  [   ] Independent     [   x ] With device                            [ x  ]  CG                                                         [ x  ]  CG                                                                  [ x  ] CG                            [    ] Min A                                                   [   ] Min A                                                              [   ] Min  A          DISCHARGE PLAN:   [   ]  Good candidate for Intensive Rehabilitation/Hospital based                                             Will tolerate 3hrs Intensive Rehab Daily                                       [ x   ]  Short Term Rehab in Skilled Nursing Facility                                       [    ]  Home with Outpatient or  services                                         [    ]  Possible Candidate for Intensive Hospital based Rehab

## 2020-09-09 NOTE — PHYSICAL THERAPY INITIAL EVALUATION ADULT - LEVEL OF INDEPENDENCE: SIT/STAND, REHAB EVAL
unable to follow commands, impaired safety and unable to wt bear through B/L LEs/dependent (less than 25% patients effort)

## 2020-09-09 NOTE — PHYSICAL THERAPY INITIAL EVALUATION ADULT - LEVEL OF INDEPENDENCE: BED TO CHAIR, REHAB EVAL
Not performed due to impaired safety and pt was unable to do wt bearing on B/L LEs to perform sit to stand/unable to perform

## 2020-09-10 ENCOUNTER — TRANSCRIPTION ENCOUNTER (OUTPATIENT)
Age: 85
End: 2020-09-10

## 2020-09-10 LAB
ANION GAP SERPL CALC-SCNC: 8 MMOL/L — SIGNIFICANT CHANGE UP (ref 7–14)
BLD GP AB SCN SERPL QL: SIGNIFICANT CHANGE UP
BUN SERPL-MCNC: 19 MG/DL — SIGNIFICANT CHANGE UP (ref 10–20)
CALCIUM SERPL-MCNC: 8 MG/DL — LOW (ref 8.5–10.1)
CHLORIDE SERPL-SCNC: 107 MMOL/L — SIGNIFICANT CHANGE UP (ref 98–110)
CO2 SERPL-SCNC: 29 MMOL/L — SIGNIFICANT CHANGE UP (ref 17–32)
CREAT SERPL-MCNC: 0.6 MG/DL — LOW (ref 0.7–1.5)
GLUCOSE BLDC GLUCOMTR-MCNC: 121 MG/DL — HIGH (ref 70–99)
GLUCOSE BLDC GLUCOMTR-MCNC: 132 MG/DL — HIGH (ref 70–99)
GLUCOSE BLDC GLUCOMTR-MCNC: 168 MG/DL — HIGH (ref 70–99)
GLUCOSE BLDC GLUCOMTR-MCNC: 193 MG/DL — HIGH (ref 70–99)
GLUCOSE SERPL-MCNC: 116 MG/DL — HIGH (ref 70–99)
HCT VFR BLD CALC: 22.6 % — LOW (ref 37–47)
HCT VFR BLD CALC: 27 % — LOW (ref 37–47)
HGB BLD-MCNC: 6.9 G/DL — CRITICAL LOW (ref 12–16)
HGB BLD-MCNC: 8.5 G/DL — LOW (ref 12–16)
MAGNESIUM SERPL-MCNC: 1.8 MG/DL — SIGNIFICANT CHANGE UP (ref 1.8–2.4)
MCHC RBC-ENTMCNC: 26.3 PG — LOW (ref 27–31)
MCHC RBC-ENTMCNC: 27.2 PG — SIGNIFICANT CHANGE UP (ref 27–31)
MCHC RBC-ENTMCNC: 30.5 G/DL — LOW (ref 32–37)
MCHC RBC-ENTMCNC: 31.5 G/DL — LOW (ref 32–37)
MCV RBC AUTO: 86.3 FL — SIGNIFICANT CHANGE UP (ref 81–99)
MCV RBC AUTO: 86.5 FL — SIGNIFICANT CHANGE UP (ref 81–99)
NRBC # BLD: 0 /100 WBCS — SIGNIFICANT CHANGE UP (ref 0–0)
NRBC # BLD: 0 /100 WBCS — SIGNIFICANT CHANGE UP (ref 0–0)
PLATELET # BLD AUTO: 339 K/UL — SIGNIFICANT CHANGE UP (ref 130–400)
PLATELET # BLD AUTO: 344 K/UL — SIGNIFICANT CHANGE UP (ref 130–400)
POTASSIUM SERPL-MCNC: 3.4 MMOL/L — LOW (ref 3.5–5)
POTASSIUM SERPL-SCNC: 3.4 MMOL/L — LOW (ref 3.5–5)
RBC # BLD: 2.62 M/UL — LOW (ref 4.2–5.4)
RBC # BLD: 3.12 M/UL — LOW (ref 4.2–5.4)
RBC # FLD: 13.5 % — SIGNIFICANT CHANGE UP (ref 11.5–14.5)
RBC # FLD: 13.8 % — SIGNIFICANT CHANGE UP (ref 11.5–14.5)
SARS-COV-2 RNA SPEC QL NAA+PROBE: SIGNIFICANT CHANGE UP
SODIUM SERPL-SCNC: 144 MMOL/L — SIGNIFICANT CHANGE UP (ref 135–146)
WBC # BLD: 7.34 K/UL — SIGNIFICANT CHANGE UP (ref 4.8–10.8)
WBC # BLD: 7.76 K/UL — SIGNIFICANT CHANGE UP (ref 4.8–10.8)
WBC # FLD AUTO: 7.34 K/UL — SIGNIFICANT CHANGE UP (ref 4.8–10.8)
WBC # FLD AUTO: 7.76 K/UL — SIGNIFICANT CHANGE UP (ref 4.8–10.8)

## 2020-09-10 PROCEDURE — 99233 SBSQ HOSP IP/OBS HIGH 50: CPT

## 2020-09-10 RX ORDER — POTASSIUM CHLORIDE 20 MEQ
20 PACKET (EA) ORAL
Refills: 0 | Status: COMPLETED | OUTPATIENT
Start: 2020-09-10 | End: 2020-09-10

## 2020-09-10 RX ORDER — ALPRAZOLAM 0.25 MG
1 TABLET ORAL
Qty: 0 | Refills: 0 | DISCHARGE

## 2020-09-10 RX ORDER — POTASSIUM CHLORIDE 20 MEQ
40 PACKET (EA) ORAL ONCE
Refills: 0 | Status: COMPLETED | OUTPATIENT
Start: 2020-09-10 | End: 2020-09-10

## 2020-09-10 RX ADMIN — Medication 40 MILLIEQUIVALENT(S): at 14:25

## 2020-09-10 RX ADMIN — SIMVASTATIN 20 MILLIGRAM(S): 20 TABLET, FILM COATED ORAL at 21:52

## 2020-09-10 RX ADMIN — Medication 4 UNIT(S): at 12:12

## 2020-09-10 RX ADMIN — Medication 4 UNIT(S): at 08:17

## 2020-09-10 RX ADMIN — Medication 20 MILLIEQUIVALENT(S): at 17:07

## 2020-09-10 RX ADMIN — Medication 4 UNIT(S): at 17:08

## 2020-09-10 RX ADMIN — ENOXAPARIN SODIUM 40 MILLIGRAM(S): 100 INJECTION SUBCUTANEOUS at 21:52

## 2020-09-10 RX ADMIN — MEMANTINE HYDROCHLORIDE 10 MILLIGRAM(S): 10 TABLET ORAL at 17:08

## 2020-09-10 RX ADMIN — Medication 20 MILLIEQUIVALENT(S): at 14:25

## 2020-09-10 RX ADMIN — INSULIN GLARGINE 12 UNIT(S): 100 INJECTION, SOLUTION SUBCUTANEOUS at 21:52

## 2020-09-10 RX ADMIN — Medication 2: at 12:12

## 2020-09-10 RX ADMIN — PANTOPRAZOLE SODIUM 40 MILLIGRAM(S): 20 TABLET, DELAYED RELEASE ORAL at 06:01

## 2020-09-10 RX ADMIN — DONEPEZIL HYDROCHLORIDE 10 MILLIGRAM(S): 10 TABLET, FILM COATED ORAL at 21:52

## 2020-09-10 RX ADMIN — MEMANTINE HYDROCHLORIDE 10 MILLIGRAM(S): 10 TABLET ORAL at 05:58

## 2020-09-10 NOTE — PROGRESS NOTE ADULT - SUBJECTIVE AND OBJECTIVE BOX
[facility]  89y  [facility] [id number] A      Patient is a 89y old  Female who presents with a chief complaint of L greater trochanter fracture (10 Sep 2020 10:48)      INTERVAL HPI/OVERNIGHT EVENTS:    no acute events overnight     REVIEW OF SYSTEMS:  ROS neg  FAMILY HISTORY:  No pertinent family history in first degree relatives    T(C): 36.5 (09-10-20 @ 07:30), Max: 36.5 (09-10-20 @ 00:33)  HR: 80 (09-10-20 @ 07:30) (73 - 80)  BP: 110/60 (09-10-20 @ 07:30) (110/60 - 123/60)  RR: 18 (09-10-20 @ 07:30) (18 - 18)  SpO2: 96% (09-10-20 @ 07:30) (96% - 97%)  Wt(kg): --Vital Signs Last 24 Hrs  T(C): 36.5 (10 Sep 2020 07:30), Max: 36.5 (10 Sep 2020 00:33)  T(F): 97.7 (10 Sep 2020 07:30), Max: 97.7 (10 Sep 2020 00:33)  HR: 80 (10 Sep 2020 07:30) (73 - 80)  BP: 110/60 (10 Sep 2020 07:30) (110/60 - 123/60)  BP(mean): --  RR: 18 (10 Sep 2020 07:30) (18 - 18)  SpO2: 96% (10 Sep 2020 07:30) (96% - 97%)    PHYSICAL EXAM:  GEN Lying in no acute distress  HEENT Pupils equal and reactive to light and accommodationSupple Neck  PULM Clear to auscultation bilaterally  CV s1s2 regular rate and rhythm  GI + bowel sounds nontnender  EXT no cyanosis or edema  INTEG No Lesions  NEURO Moves all extremities      LABS:                            6.9    7.34  )-----------( 339      ( 10 Sep 2020 07:55 )             22.6   09-10    144  |  107  |  19  ----------------------------<  116<H>  3.4<L>   |  29  |  0.6<L>    Ca    8.0<L>      10 Sep 2020 07:55  Mg     1.8     09-10    TPro  5.1<L>  /  Alb  2.7<L>  /  TBili  0.2  /  DBili  x   /  AST  11  /  ALT  <5  /  AlkPhos  55  09-09            dextrose 40% Gel 15 Gram(s) Oral once PRN  dextrose 50% Injectable 12.5 Gram(s) IV Push once  dextrose 50% Injectable 25 Gram(s) IV Push once  dextrose 50% Injectable 25 Gram(s) IV Push once  donepezil 10 milliGRAM(s) Oral at bedtime  enoxaparin Injectable 40 milliGRAM(s) SubCutaneous at bedtime  glucagon  Injectable 1 milliGRAM(s) IntraMuscular once PRN  insulin glargine Injectable (LANTUS) 12 Unit(s) SubCutaneous at bedtime  insulin lispro (HumaLOG) corrective regimen sliding scale   SubCutaneous three times a day before meals  insulin lispro Injectable (HumaLOG) 4 Unit(s) SubCutaneous three times a day before meals  memantine 10 milliGRAM(s) Oral two times a day  morphine  - Injectable 2 milliGRAM(s) IV Push every 6 hours PRN  pantoprazole    Tablet 40 milliGRAM(s) Oral before breakfast  simvastatin 20 milliGRAM(s) Oral at bedtime      HEALTH ISSUES - PROBLEM Dx:          Case Discussed with House Staff     Spectra [phone]

## 2020-09-10 NOTE — MEDICAL STUDENT PROGRESS NOTE(EDUCATION) - NS MD HP STUD ASPLAN ASSES FT
Patient is a 89 year old female with PMH of T2DM, DLD, dementia, breast cancer s/p I&D bilateral thigh and legs POD #3 presenting for L hip fracture. Patient is a 89 year old female with PMH of T2DM, DLD, dementia, breast cancer s/p s/p L hip IMN & R prox femur CRPP POD #3 presenting for L hip fracture. Patient is a 89 year old female with PMH of T2DM, DLD, dementia, breast cancer s/p L hip IMN & R prox femur CRPP POD #3 presenting for L hip fracture.

## 2020-09-10 NOTE — DISCHARGE NOTE PROVIDER - HOSPITAL COURSE
89 year old F coming from home, hx of DM, DLD, breast ca s/p rt mastectomy/chemo presenting for poss L hip fx. As per daughter pt had witnesses fall out of bed x 2 days before admision ago onto L hip. No head trauma/loc. STs went to [facility] ED and was called back for L hip fx. Pt is mostly wheelchair bound but can walk with cane. She has otherwise been acting at baseline since fall. No decreased po intake, vomiting, worsening confusion (baseline confused AO1), cough, fever, uri symptoms, urinary or GI sx.        In the ED, Hg 8.3 with baseline 11.8 from January 2019,  Acute comminuted fracture of the left greater trochanter with 3 cm displacement superiorly. Associated hematoma at the fracture site measuring 3.3 x 2.0 cm, ortho consulted and following        Orthopedics team was consulted and percutaneous pinning was done.    She was treated with insulin for DM2 hyperglycemia during her admission and donepezil with memantine for her dementia    she was on lovenx for dvt prophylacxx    alprazolam will be stopped due to an increase risk of fall    she had a hg drop to less than 7 , a transfusion will be given and the patient will be discharged

## 2020-09-10 NOTE — DISCHARGE NOTE PROVIDER - NSDCMRMEDTOKEN_GEN_ALL_CORE_FT
diphenoxylate-atropine 2.5 mg-0.025 mg oral tablet: 2 tab(s) orally once a day, As Needed  for diarrhea  donepezil 10 mg oral tablet: 1 tab(s) orally once a day (at bedtime)  glipiZIDE 10 mg oral tablet: 1 tab(s) orally once a day  memantine 10 mg oral tablet: 1 tab(s) orally 2 times a day  metFORMIN 1000 mg oral tablet: 1 tab(s) orally 2 times a day  simvastatin 20 mg oral tablet: 1 tab(s) orally once a day (at bedtime)

## 2020-09-10 NOTE — PROGRESS NOTE ADULT - ASSESSMENT
HPI:  89 year old F coming from home, hx of DM, DLD, breast ca s/p rt mastectomy/chemo presenting for poss L hip fx. As per daughter pt had witnesses fall out of bed x 2 days ago onto L hip. No head trauma/loc. STs went to [facility] ED yesterday and was called back for L hip fx. Pt is mostly wheelchair bound but can walk with cane. She has otherwise been acting at baseline since fall. No decreased po intake, vomiting, worsening confusion (baseline confused AO1), cough, fever, uri symptoms, urinary or GI sx.    #L greater trochanter fracture   sp IMN  9/7       #acute blood loss anemia on chronic normocytic anemia suspected to be secondary to post op ,   transfuse one prbc  repeat cbc   no evidence of any other blood loss     #DM II   CAPILLARY BLOOD GLUCOSE      POCT Blood Glucose.: 168 mg/dL (10 Sep 2020 11:40)  POCT Blood Glucose.: 132 mg/dL (10 Sep 2020 07:56)  POCT Blood Glucose.: 284 mg/dL (09 Sep 2020 21:12)  POCT Blood Glucose.: 110 mg/dL (09 Sep 2020 17:04)    controlled     #DLD     #Acute hypokalemia - replete     #breast cancer s/p rt mastectomy/ chemo outpatient follow up    #alzheimer dementia  at baseline , on memantine    Progress Note Handoff    Pending:  DC planning, xfuse one prbc and repeat cbc    Family discussion: left message at 12:14 PM  [phone]    Disposition: STR

## 2020-09-10 NOTE — DISCHARGE NOTE PROVIDER - NSDCCPCAREPLAN_GEN_ALL_CORE_FT
PRINCIPAL DISCHARGE DIAGNOSIS  Diagnosis: Greater trochanter fracture  Assessment and Plan of Treatment: you prsented with a fracture in the femur head after the fall you had. CT scan of you pelvis confirmed the fracture along with a hematoma next to it . Surgey was done and the fracture was fixed. You had a drop in your hemoglobin so a unit of prbs was trasfused. Alprazolam wi be stopped since it increases the risk of falls. Please follow up with the orthopedics.

## 2020-09-10 NOTE — MEDICAL STUDENT PROGRESS NOTE(EDUCATION) - NS MD HP STUD ASPLAN PLAN FT
1. L greater trochanter fracture  - hx of mechanical fall  - CT abd/pevlis = actue non-displaced L intertrochanteric fx & superiorly displaced greater trochanter avulsion  - s/p L hip IMN & R prox femur CRPP POD #3  - as per ortho: percutaneous pinning done by the orthopedics team, cleared for discharge, transfuse if Hb <7  - as per physiatry: bedside PT/OT, dispo to STR in skilled nursing facility  - Hb today = 6.9  - 1U RBC, monitor PM labs  - if PM labs WNL, d/c today & notify daughter Lilliana ([phone])    2. Normocytic anemia  - Hb = 8.3 on admission  - Hb = 6.9  - trend Hb  - maintain active T&S    3. T2DM  - glucose today = 116  - continue Lantus 12 U, Humalog 4U, ISS    4. DLD  - continue simvastatin 20 mg    5. Breast cancer s/p mastectomy/chemo outpt  - f/u outpt    6. Alzheimer's disease  - baseline = AOx1  - continue donepezil 10 mg, memantine 10 mg, alprazolam 0.5 mg  - discontinue alprazolam 0.5 mg on discharge    7. Healthcare maintenance  - DVT ppx: enoxaparin 40 mg SQ  - GI ppx: pantoprazole 40 mg  - Diet: DASH/TLC, sodium & cholesterol restricted  - Code status: Full code  - Dispo: STR with skilled nursing facility 1. L greater trochanter fracture  - hx of mechanical fall  - CT abd/pevlis = actue non-displaced L intertrochanteric fx & superiorly displaced greater trochanter avulsion  - s/p L hip IMN & R prox femur CRPP POD #3  - as per ortho: percutaneous pinning done by the orthopedics team, cleared for discharge, transfuse if Hb <7  - as per physiatry: bedside PT/OT, dispo to STR in skilled nursing facility  - Hb today = 6.9  - ordered 1U RBC, monitor PM labs  - if PM labs WNL, d/c today & notify daughter Lilliana ([phone])    2. Normocytic anemia  - Hb = 8.3 on admission, Hb = 6.9 today  - maintain active T&S    3. T2DM  - glucose today = 116  - continue Lantus 12 U, Humalog 4U, ISS    4. DLD  - continue simvastatin 20 mg    5. Breast cancer s/p mastectomy/chemo outpt  - f/u outpt    6. Alzheimer's disease  - baseline = AOx1  - continue donepezil 10 mg, memantine 10 mg, alprazolam 0.5 mg  - discontinue alprazolam 0.5 mg on discharge    7. Healthcare maintenance  - DVT ppx: enoxaparin 40 mg SQ  - GI ppx: pantoprazole 40 mg  - Diet: DASH/TLC, sodium & cholesterol restricted  - Code status: Full code  - Dispo: STR with skilled nursing facility

## 2020-09-10 NOTE — DISCHARGE NOTE PROVIDER - PROVIDER TOKENS
PROVIDER:[TOKEN:[[id number]],FOLLOWUP:[2 weeks]],FREE:[LAST:[charlotte],FIRST:[gila],PHONE:[phone]:[(   [phone]    -],ADDRESS:[[place], [place]],FOLLOWUP:[2 weeks]]

## 2020-09-10 NOTE — MEDICAL STUDENT PROGRESS NOTE(EDUCATION) - SUBJECTIVE AND OBJECTIVE BOX
Patient is a 89 year old female with PMH of T2DM, DLD, dementia, breast cancer s/p I&D bilateral thigh and legs POD #3 presenting for L hip fracture. As per daughter, patient had witnesses fall out of bed 2 days ago onto L hip. No head trauma, LOC. STs went to [facility] previously and was called back for L hip fracture. At home, patient is mostly wheelchair bound but can walk with cane. She has otherwise been acting at baseline since fall. Denies PO intake, vomiting, worsening confusion (baseline confused AOx1), cough, fever, URI symptoms, urinary or GI sx.    In the ED, Hg 8.3 with baseline 11.8 from January 2019,  Acute comminuted fracture of the left greater trochanter with 3 cm displacement superiorly. Associated hematoma at the fracture site measuring 3.3 x 2.0 cm, ortho consulted and following (06 Sep 2020 00:13)    Currently admitted to medicine with the primary diagnosis of L greater trochanter fracture     Today is hospital day 5d.     INTERVAL HPI / OVERNIGHT EVENTS:  Patient was examined and seen at bedside. This morning she is resting comfortably in bed and reports no new issues or overnight events. Patient is able to eat, urinate, defecate, and slept fine overnight. No ambulatory activity.    ROS: Otherwise unremarkable     PAST MEDICAL & SURGICAL HISTORY  Systolic heart failure  Breast CA  Depression  Diabetes  Hypertension  H/O mastectomy, right    ALLERGIES  aspirin (Unknown)    MEDICATIONS  STANDING MEDICATIONS  ALPRAZolam 0.5 milliGRAM(s) Oral at bedtime  dextrose 50% Injectable 12.5 Gram(s) IV Push once  dextrose 50% Injectable 25 Gram(s) IV Push once  dextrose 50% Injectable 25 Gram(s) IV Push once  donepezil 10 milliGRAM(s) Oral at bedtime  enoxaparin Injectable 40 milliGRAM(s) SubCutaneous at bedtime  insulin glargine Injectable (LANTUS) 12 Unit(s) SubCutaneous at bedtime  insulin lispro (HumaLOG) corrective regimen sliding scale   SubCutaneous three times a day before meals  insulin lispro Injectable (HumaLOG) 4 Unit(s) SubCutaneous three times a day before meals  memantine 10 milliGRAM(s) Oral two times a day  pantoprazole    Tablet 40 milliGRAM(s) Oral before breakfast  simvastatin 20 milliGRAM(s) Oral at bedtime    PRN MEDICATIONS  dextrose 40% Gel 15 Gram(s) Oral once PRN  glucagon  Injectable 1 milliGRAM(s) IntraMuscular once PRN  morphine  - Injectable 2 milliGRAM(s) IV Push every 6 hours PRN    VITALS:  T(F): 97  HR: 80  BP: 110/60  RR: 18  SpO2: 96%    PHYSICAL EXAM  GEN: NAD, Resting comfortably in bed  PULM: Clear to auscultation bilaterally, No wheezes  CVS: Regular rate and rhythm, S1-S2, no murmurs  ABD: Soft, non-tender, non-distended, no guarding  EXT: No edema  NEURO: A&Ox3, no focal deficits    LABS             6.9    7.34  )-----------( 339      ( 10 Sep 2020 07:55 )             22.6     09-10  144  |  107  |  19  ----------------------------<  116<H>  3.4<L>   |  29  |  0.6<L>    Ca    8.0<L>      10 Sep 2020 07:55  Mg     1.8     09-10    TPro  5.1<L>  /  Alb  2.7<L>  /  TBili  0.2  /  DBili  x   /  AST  11  /  ALT  <5  /  AlkPhos  55  09-09    RADIOLOGY  EXAM:  XR CHEST PORTABLE ROUTINE 1V          PROCEDURE DATE:  09/07/2020      INTERPRETATION:  Clinical History / Reason for exam: Sepsis  Comparison : Chest radiograph:  9/582020  Technique/Positioning: Frontal view of the chest.  Findings:  Cardiac/mediastinum/hilum: No significant change  Lung parenchyma/Pleura: There is no focal consolidation, pleural effusion or pneumothorax.  Skeleton/soft tissues: Stable    Impression:  No evidence of focal consolidation, pleural effusion or pneumothorax.  New bilateral reticular opacities.    EXAM:  CT PELVIS ONLY          PROCEDURE DATE:  09/05/2020      INTERPRETATION:  Clinical History / Reason for exam: Fall, left hip pain.  Technique:  Multiaxial CT images of the pelvis were obtained.  Coronal and sagittal reformatted images were obtained.  Comparison: Bilateral hip radiographs of the same day.    Findings:  Acute comminuted fracture of the left greater trochanter with approximately 3 cm displacement superiorly. Diffuse osteopenia. Moderate osteoarthritis of the bilateral hips. Degenerative changes of the lower lumbar spine and pubic symphysis. Small bilateral hip joint effusions.  Hemorrhagic density measuring 3.3 x 2.0 cm at the left greater trochanter fracture site, likely represent hematoma. Diffuse anasarca.  Visualized lower abdomen and pelvis unremarkable.  Atherosclerotic vascular calcifications.    Impression:  Acute comminuted fracture of the left greater trochanter with 3 cm displacement superiorly. Associated hematoma at the fracture site measuring 3.3 x 2.0 cm.  Diffuse osteopenia.  Moderate osteoarthritis of the bilateral hips.  Small bilateral hip joint effusions. Patient is a 89 year old female with PMH of T2DM, DLD, dementia, breast cancer s/p I&D L hip IMN and R proximal femur CRPP POD #3 presenting for L hip fracture. As per daughter, patient had witnesses fall out of bed 2 days ago onto L hip. No head trauma, LOC. STs went to [place] ED previously and was called back for L hip fracture. At home, patient is mostly wheelchair bound but can walk with cane. She has otherwise been acting at baseline since fall. Denies PO intake, vomiting, worsening confusion (baseline confused AOx1), cough, fever, URI symptoms, urinary or GI sx.    In the ED, Hg 8.3 with baseline 11.8 from January 2019,  Acute comminuted fracture of the left greater trochanter with 3 cm displacement superiorly. Associated hematoma at the fracture site measuring 3.3 x 2.0 cm, ortho consulted and following (06 Sep 2020 00:13)    Currently admitted to medicine with the primary diagnosis of L greater trochanter fracture     Today is hospital day 5d.     INTERVAL HPI / OVERNIGHT EVENTS:  Patient was examined and seen at bedside. This morning she is resting comfortably in bed and reports no new issues or overnight events. Patient is able to eat, urinate, defecate, and slept fine overnight. No ambulatory activity.    ROS: Otherwise unremarkable     PAST MEDICAL & SURGICAL HISTORY  Systolic heart failure  Breast CA  Depression  Diabetes  Hypertension  H/O mastectomy, right    ALLERGIES  aspirin (Unknown)    MEDICATIONS  STANDING MEDICATIONS  ALPRAZolam 0.5 milliGRAM(s) Oral at bedtime  dextrose 50% Injectable 12.5 Gram(s) IV Push once  dextrose 50% Injectable 25 Gram(s) IV Push once  dextrose 50% Injectable 25 Gram(s) IV Push once  donepezil 10 milliGRAM(s) Oral at bedtime  enoxaparin Injectable 40 milliGRAM(s) SubCutaneous at bedtime  insulin glargine Injectable (LANTUS) 12 Unit(s) SubCutaneous at bedtime  insulin lispro (HumaLOG) corrective regimen sliding scale   SubCutaneous three times a day before meals  insulin lispro Injectable (HumaLOG) 4 Unit(s) SubCutaneous three times a day before meals  memantine 10 milliGRAM(s) Oral two times a day  pantoprazole    Tablet 40 milliGRAM(s) Oral before breakfast  simvastatin 20 milliGRAM(s) Oral at bedtime    PRN MEDICATIONS  dextrose 40% Gel 15 Gram(s) Oral once PRN  glucagon  Injectable 1 milliGRAM(s) IntraMuscular once PRN  morphine  - Injectable 2 milliGRAM(s) IV Push every 6 hours PRN    VITALS:  T(F): 97  HR: 80  BP: 110/60  RR: 18  SpO2: 96%    PHYSICAL EXAM  GEN: NAD, Resting comfortably in bed  PULM: Clear to auscultation bilaterally, No wheezes  CVS: Regular rate and rhythm, S1-S2, no murmurs  ABD: Soft, non-tender, non-distended, no guarding  EXT: No edema  NEURO: A&Ox3, no focal deficits    LABS             6.9    7.34  )-----------( 339      ( 10 Sep 2020 07:55 )             22.6     09-10  144  |  107  |  19  ----------------------------<  116<H>  3.4<L>   |  29  |  0.6<L>    Ca    8.0<L>      10 Sep 2020 07:55  Mg     1.8     09-10    TPro  5.1<L>  /  Alb  2.7<L>  /  TBili  0.2  /  DBili  x   /  AST  11  /  ALT  <5  /  AlkPhos  55  09-09    RADIOLOGY  EXAM:  XR CHEST PORTABLE ROUTINE 1V          PROCEDURE DATE:  09/07/2020      INTERPRETATION:  Clinical History / Reason for exam: Sepsis  Comparison : Chest radiograph:  9/582020  Technique/Positioning: Frontal view of the chest.  Findings:  Cardiac/mediastinum/hilum: No significant change  Lung parenchyma/Pleura: There is no focal consolidation, pleural effusion or pneumothorax.  Skeleton/soft tissues: Stable    Impression:  No evidence of focal consolidation, pleural effusion or pneumothorax.  New bilateral reticular opacities.    EXAM:  CT PELVIS ONLY          PROCEDURE DATE:  09/05/2020      INTERPRETATION:  Clinical History / Reason for exam: Fall, left hip pain.  Technique:  Multiaxial CT images of the pelvis were obtained.  Coronal and sagittal reformatted images were obtained.  Comparison: Bilateral hip radiographs of the same day.    Findings:  Acute comminuted fracture of the left greater trochanter with approximately 3 cm displacement superiorly. Diffuse osteopenia. Moderate osteoarthritis of the bilateral hips. Degenerative changes of the lower lumbar spine and pubic symphysis. Small bilateral hip joint effusions.  Hemorrhagic density measuring 3.3 x 2.0 cm at the left greater trochanter fracture site, likely represent hematoma. Diffuse anasarca.  Visualized lower abdomen and pelvis unremarkable.  Atherosclerotic vascular calcifications.    Impression:  Acute comminuted fracture of the left greater trochanter with 3 cm displacement superiorly. Associated hematoma at the fracture site measuring 3.3 x 2.0 cm.  Diffuse osteopenia.  Moderate osteoarthritis of the bilateral hips.  Small bilateral hip joint effusions. Patient is a 89 year old female with PMH of T2DM, DLD, dementia, breast cancer s/p L hip IMN and R proximal femur CRPP POD #3 presenting for L hip fracture. As per daughter, patient had witnesses fall out of bed 2 days ago onto L hip. No head trauma, LOC. STs went to [facility] ED previously and was called back for L hip fracture. At home, patient is mostly wheelchair bound but can walk with cane. She has otherwise been acting at baseline since fall. Denies PO intake, vomiting, worsening confusion (baseline confused AOx1), cough, fever, URI symptoms, urinary or GI sx.    In the ED, Hg 8.3 with baseline 11.8 from January 2019,  Acute comminuted fracture of the left greater trochanter with 3 cm displacement superiorly. Associated hematoma at the fracture site measuring 3.3 x 2.0 cm, ortho consulted and following (06 Sep 2020 00:13)    Currently admitted to medicine with the primary diagnosis of L greater trochanter fracture     Today is hospital day 5d.     INTERVAL HPI / OVERNIGHT EVENTS:  Patient was examined and seen at bedside. This morning she is resting comfortably in bed and reports no new issues or overnight events. Patient is able to eat, urinate, defecate, and slept fine overnight. No ambulatory activity.    ROS: Otherwise unremarkable     PAST MEDICAL & SURGICAL HISTORY  Systolic heart failure  Breast CA  Depression  Diabetes  Hypertension  H/O mastectomy, right    ALLERGIES  aspirin (Unknown)    MEDICATIONS  STANDING MEDICATIONS  ALPRAZolam 0.5 milliGRAM(s) Oral at bedtime  dextrose 50% Injectable 12.5 Gram(s) IV Push once  dextrose 50% Injectable 25 Gram(s) IV Push once  dextrose 50% Injectable 25 Gram(s) IV Push once  donepezil 10 milliGRAM(s) Oral at bedtime  enoxaparin Injectable 40 milliGRAM(s) SubCutaneous at bedtime  insulin glargine Injectable (LANTUS) 12 Unit(s) SubCutaneous at bedtime  insulin lispro (HumaLOG) corrective regimen sliding scale   SubCutaneous three times a day before meals  insulin lispro Injectable (HumaLOG) 4 Unit(s) SubCutaneous three times a day before meals  memantine 10 milliGRAM(s) Oral two times a day  pantoprazole    Tablet 40 milliGRAM(s) Oral before breakfast  simvastatin 20 milliGRAM(s) Oral at bedtime    PRN MEDICATIONS  dextrose 40% Gel 15 Gram(s) Oral once PRN  glucagon  Injectable 1 milliGRAM(s) IntraMuscular once PRN  morphine  - Injectable 2 milliGRAM(s) IV Push every 6 hours PRN    VITALS:  T(F): 97  HR: 80  BP: 110/60  RR: 18  SpO2: 96%    PHYSICAL EXAM  GEN: NAD, Resting comfortably in bed  PULM: Clear to auscultation bilaterally, No wheezes  CVS: Regular rate and rhythm, S1-S2, no murmurs  ABD: Soft, non-tender, non-distended, no guarding  EXT: No edema  NEURO: A&Ox3, no focal deficits    LABS             6.9    7.34  )-----------( 339      ( 10 Sep 2020 07:55 )             22.6     09-10  144  |  107  |  19  ----------------------------<  116<H>  3.4<L>   |  29  |  0.6<L>    Ca    8.0<L>      10 Sep 2020 07:55  Mg     1.8     09-10    TPro  5.1<L>  /  Alb  2.7<L>  /  TBili  0.2  /  DBili  x   /  AST  11  /  ALT  <5  /  AlkPhos  55  09-09    RADIOLOGY  EXAM:  XR CHEST PORTABLE ROUTINE 1V          PROCEDURE DATE:  09/07/2020      INTERPRETATION:  Clinical History / Reason for exam: Sepsis  Comparison : Chest radiograph:  9/582020  Technique/Positioning: Frontal view of the chest.  Findings:  Cardiac/mediastinum/hilum: No significant change  Lung parenchyma/Pleura: There is no focal consolidation, pleural effusion or pneumothorax.  Skeleton/soft tissues: Stable    Impression:  No evidence of focal consolidation, pleural effusion or pneumothorax.  New bilateral reticular opacities.    EXAM:  CT PELVIS ONLY          PROCEDURE DATE:  09/05/2020      INTERPRETATION:  Clinical History / Reason for exam: Fall, left hip pain.  Technique:  Multiaxial CT images of the pelvis were obtained.  Coronal and sagittal reformatted images were obtained.  Comparison: Bilateral hip radiographs of the same day.    Findings:  Acute comminuted fracture of the left greater trochanter with approximately 3 cm displacement superiorly. Diffuse osteopenia. Moderate osteoarthritis of the bilateral hips. Degenerative changes of the lower lumbar spine and pubic symphysis. Small bilateral hip joint effusions.  Hemorrhagic density measuring 3.3 x 2.0 cm at the left greater trochanter fracture site, likely represent hematoma. Diffuse anasarca.  Visualized lower abdomen and pelvis unremarkable.  Atherosclerotic vascular calcifications.    Impression:  Acute comminuted fracture of the left greater trochanter with 3 cm displacement superiorly. Associated hematoma at the fracture site measuring 3.3 x 2.0 cm.  Diffuse osteopenia.  Moderate osteoarthritis of the bilateral hips.  Small bilateral hip joint effusions. Patient is a 89 year old female with PMH of T2DM, DLD, dementia, breast cancer s/p L hip IMN and R proximal femur CRPP POD #4 presenting for L hip fracture. As per daughter, patient had witnesses fall out of bed 2 days ago onto L hip. No head trauma, LOC. STs went to [facility] previously and was called back for L hip fracture. At home, patient is mostly wheelchair bound but can walk with cane. She has otherwise been acting at baseline since fall. Denies PO intake, vomiting, worsening confusion (baseline confused AOx1), cough, fever, URI symptoms, urinary or GI sx.    In the ED, Hg 8.3 with baseline 11.8 from January 2019,  Acute comminuted fracture of the left greater trochanter with 3 cm displacement superiorly. Associated hematoma at the fracture site measuring 3.3 x 2.0 cm, ortho consulted and following (06 Sep 2020 00:13)    Currently admitted to medicine with the primary diagnosis of L greater trochanter fracture     Today is hospital day 5d.     INTERVAL HPI / OVERNIGHT EVENTS:  Patient was examined and seen at bedside. This morning she is resting comfortably in bed and reports no new issues or overnight events. Patient is able to eat, urinate, defecate, and slept fine overnight. No ambulatory activity.    ROS: Otherwise unremarkable     PAST MEDICAL & SURGICAL HISTORY  Systolic heart failure  Breast CA  Depression  Diabetes  Hypertension  H/O mastectomy, right    ALLERGIES  aspirin (Unknown)    MEDICATIONS  STANDING MEDICATIONS  ALPRAZolam 0.5 milliGRAM(s) Oral at bedtime  dextrose 50% Injectable 12.5 Gram(s) IV Push once  dextrose 50% Injectable 25 Gram(s) IV Push once  dextrose 50% Injectable 25 Gram(s) IV Push once  donepezil 10 milliGRAM(s) Oral at bedtime  enoxaparin Injectable 40 milliGRAM(s) SubCutaneous at bedtime  insulin glargine Injectable (LANTUS) 12 Unit(s) SubCutaneous at bedtime  insulin lispro (HumaLOG) corrective regimen sliding scale   SubCutaneous three times a day before meals  insulin lispro Injectable (HumaLOG) 4 Unit(s) SubCutaneous three times a day before meals  memantine 10 milliGRAM(s) Oral two times a day  pantoprazole    Tablet 40 milliGRAM(s) Oral before breakfast  simvastatin 20 milliGRAM(s) Oral at bedtime    PRN MEDICATIONS  dextrose 40% Gel 15 Gram(s) Oral once PRN  glucagon  Injectable 1 milliGRAM(s) IntraMuscular once PRN  morphine  - Injectable 2 milliGRAM(s) IV Push every 6 hours PRN    VITALS:  T(F): 97  HR: 80  BP: 110/60  RR: 18  SpO2: 96%    PHYSICAL EXAM  GEN: NAD, Resting comfortably in bed  PULM: Clear to auscultation bilaterally, No wheezes  CVS: Regular rate and rhythm, S1-S2, no murmurs  ABD: Soft, non-tender, non-distended, no guarding  EXT: No edema  NEURO: A&Ox3, no focal deficits    LABS             6.9    7.34  )-----------( 339      ( 10 Sep 2020 07:55 )             22.6     09-10  144  |  107  |  19  ----------------------------<  116<H>  3.4<L>   |  29  |  0.6<L>    Ca    8.0<L>      10 Sep 2020 07:55  Mg     1.8     09-10    TPro  5.1<L>  /  Alb  2.7<L>  /  TBili  0.2  /  DBili  x   /  AST  11  /  ALT  <5  /  AlkPhos  55  09-09    RADIOLOGY  EXAM:  XR CHEST PORTABLE ROUTINE 1V          PROCEDURE DATE:  09/07/2020      INTERPRETATION:  Clinical History / Reason for exam: Sepsis  Comparison : Chest radiograph:  9/582020  Technique/Positioning: Frontal view of the chest.  Findings:  Cardiac/mediastinum/hilum: No significant change  Lung parenchyma/Pleura: There is no focal consolidation, pleural effusion or pneumothorax.  Skeleton/soft tissues: Stable    Impression:  No evidence of focal consolidation, pleural effusion or pneumothorax.  New bilateral reticular opacities.    EXAM:  CT PELVIS ONLY          PROCEDURE DATE:  09/05/2020      INTERPRETATION:  Clinical History / Reason for exam: Fall, left hip pain.  Technique:  Multiaxial CT images of the pelvis were obtained.  Coronal and sagittal reformatted images were obtained.  Comparison: Bilateral hip radiographs of the same day.    Findings:  Acute comminuted fracture of the left greater trochanter with approximately 3 cm displacement superiorly. Diffuse osteopenia. Moderate osteoarthritis of the bilateral hips. Degenerative changes of the lower lumbar spine and pubic symphysis. Small bilateral hip joint effusions.  Hemorrhagic density measuring 3.3 x 2.0 cm at the left greater trochanter fracture site, likely represent hematoma. Diffuse anasarca.  Visualized lower abdomen and pelvis unremarkable.  Atherosclerotic vascular calcifications.    Impression:  Acute comminuted fracture of the left greater trochanter with 3 cm displacement superiorly. Associated hematoma at the fracture site measuring 3.3 x 2.0 cm.  Diffuse osteopenia.  Moderate osteoarthritis of the bilateral hips.  Small bilateral hip joint effusions.

## 2020-09-10 NOTE — DISCHARGE NOTE PROVIDER - NSDCHOSPICE_GEN_A_CORE
2305:  Bedside and Verbal shift change report received from Stan Hunter RN (offgoing nurse) to Ramirez Echavarria RN (oncoming nurse). Report included the following information SBAR, Kardex, Intake/Output, MAR, Recent Results and Cardiac Rhythm Afib. Pt resting in bed. Appears to be in no distress at this time. Call bell within reach. Zone phone number given to pt. Pt instructed to call zone phone or call bell if needed. Shift summary:  Pt rested comfortably throughout the night. No

## 2020-09-10 NOTE — DISCHARGE NOTE PROVIDER - CARE PROVIDER_API CALL
Haris South  [facility]  [address] [address]  Phone: [phone]  Fax: [phone]  Follow Up Time: 2 weeks    gila porter [place]  [place]  Phone: [phone]  Fax: (   )    -  Follow Up Time: 2 weeks

## 2020-09-10 NOTE — PROGRESS NOTE ADULT - ASSESSMENT
ORTHOPEDIC PROGRESS      POD3 s/p L hip IMN and R proximal femur CRPP.  S/e at bedside. Resting comfortably in bed.       Vital Signs Last 24 Hrs  T(C): 36.5 (10 Sep 2020 07:30), Max: 36.5 (10 Sep 2020 00:33)  T(F): 97.7 (10 Sep 2020 07:30), Max: 97.7 (10 Sep 2020 00:33)  HR: 80 (10 Sep 2020 07:30) (73 - 80)  BP: 110/60 (10 Sep 2020 07:30) (110/60 - 123/60)  BP(mean): --  RR: 18 (10 Sep 2020 07:30) (18 - 18)  SpO2: 96% (10 Sep 2020 07:30) (96% - 97%)  Gen: awake alert NAD    RLE:   dressing c/d/i  Exam limited by mental status  No significant grimacing with palpation of dressings  compartments soft and compressible, no pain with passive stretch of toes  2+ dp, digits wwp    LLE:  dressing c/d/i  Exam limited by mental status  No significant grimacing with palpation of dressings  compartments soft and compressible, no pain with passive stretch of toes  2+ dp, digits wwp                                       6.9    7.34  )-----------( 339      ( 10 Sep 2020 07:55 )             22.6         A/P 89yFemale POD3 s/p L hip IMN and R proximal femur CRPP, doing well  - recommend 1u PRBC due to Hgb drop, d/w primary team  - WBAT BLE  - SCDs/DVT PPx per primary team  - pain control  - PT/rehab  - Dispo planning/rest of care per primary  - Please page orthopaedics with questions/concerns

## 2020-09-10 NOTE — PROGRESS NOTE ADULT - ATTENDING COMMENTS
agree w above  dc planning
continue care  discharge to facility tomorrow
patient seen and examined independently on morning rounds for the first time today, chart reviewed and discussed with the medicine resident and on interdisciplinary rounds and agree with the above resident progress note with the following addendum:    no overnight events-=-pod#1- pain controlled    #s/p fall- L greater troch fx and R prox femur fx  -pod#1 L hip IMN and R prox femur CRPP  - minimal participation with PT today--will need to reassess with PT tomorrow and get physiatry evaluation  -pain control  -incentive spirometry  -post-op DVT ppx (lovenox)  -monitor hb closely --repeat cbc today (hb 7.6 this am)--transfuse if hb <7 or signs of acute active bleeding  -f/u ortho recomm    #DMII--post-op uncontrolled with episodes of hyperglycemia today  --monitor fs closely  -oral glipizide and metformin on hold---will start lantus 12 u daily and humalog 4 u tid with meals--  -restart oral glipizide and metformin once stable for discharge  -DASH diet    DVT/GI ppx  guarded prognosis--anticipate dc to STR within next 24-48 hrs
Patient is medically optimized for surgical fixation.   Patient is high cardiac risk for moderate risk procedure. Patient has poor functional status (METS less than 4), DMII, and EKG showing LBBB. Echo showing wall motion abnormalities and depressed EF. Patient is not in active CHF exacerbation. No further cardiac work up warranted prior to surgical intervention since patient is not a candidate for revascularization.

## 2020-09-11 ENCOUNTER — TRANSCRIPTION ENCOUNTER (OUTPATIENT)
Age: 85
End: 2020-09-11

## 2020-09-11 VITALS
DIASTOLIC BLOOD PRESSURE: 65 MMHG | HEART RATE: 70 BPM | TEMPERATURE: 98 F | SYSTOLIC BLOOD PRESSURE: 135 MMHG | RESPIRATION RATE: 18 BRPM

## 2020-09-11 LAB
ALBUMIN SERPL ELPH-MCNC: 2.7 G/DL — LOW (ref 3.5–5.2)
ALP SERPL-CCNC: 54 U/L — SIGNIFICANT CHANGE UP (ref 30–115)
ALT FLD-CCNC: 7 U/L — SIGNIFICANT CHANGE UP (ref 0–41)
ANION GAP SERPL CALC-SCNC: 11 MMOL/L — SIGNIFICANT CHANGE UP (ref 7–14)
AST SERPL-CCNC: 10 U/L — SIGNIFICANT CHANGE UP (ref 0–41)
BILIRUB SERPL-MCNC: 0.6 MG/DL — SIGNIFICANT CHANGE UP (ref 0.2–1.2)
BUN SERPL-MCNC: 20 MG/DL — SIGNIFICANT CHANGE UP (ref 10–20)
CALCIUM SERPL-MCNC: 8.2 MG/DL — LOW (ref 8.5–10.1)
CHLORIDE SERPL-SCNC: 106 MMOL/L — SIGNIFICANT CHANGE UP (ref 98–110)
CO2 SERPL-SCNC: 26 MMOL/L — SIGNIFICANT CHANGE UP (ref 17–32)
CREAT SERPL-MCNC: 0.5 MG/DL — LOW (ref 0.7–1.5)
CULTURE RESULTS: SIGNIFICANT CHANGE UP
GLUCOSE BLDC GLUCOMTR-MCNC: 199 MG/DL — HIGH (ref 70–99)
GLUCOSE BLDC GLUCOMTR-MCNC: 254 MG/DL — HIGH (ref 70–99)
GLUCOSE SERPL-MCNC: 195 MG/DL — HIGH (ref 70–99)
HCT VFR BLD CALC: 26 % — LOW (ref 37–47)
HGB BLD-MCNC: 8.1 G/DL — LOW (ref 12–16)
MAGNESIUM SERPL-MCNC: 1.8 MG/DL — SIGNIFICANT CHANGE UP (ref 1.8–2.4)
MCHC RBC-ENTMCNC: 26.7 PG — LOW (ref 27–31)
MCHC RBC-ENTMCNC: 31.2 G/DL — LOW (ref 32–37)
MCV RBC AUTO: 85.8 FL — SIGNIFICANT CHANGE UP (ref 81–99)
NRBC # BLD: 0 /100 WBCS — SIGNIFICANT CHANGE UP (ref 0–0)
PLATELET # BLD AUTO: 362 K/UL — SIGNIFICANT CHANGE UP (ref 130–400)
POTASSIUM SERPL-MCNC: 3.9 MMOL/L — SIGNIFICANT CHANGE UP (ref 3.5–5)
POTASSIUM SERPL-SCNC: 3.9 MMOL/L — SIGNIFICANT CHANGE UP (ref 3.5–5)
PROT SERPL-MCNC: 5.2 G/DL — LOW (ref 6–8)
RBC # BLD: 3.03 M/UL — LOW (ref 4.2–5.4)
RBC # FLD: 13.6 % — SIGNIFICANT CHANGE UP (ref 11.5–14.5)
SODIUM SERPL-SCNC: 143 MMOL/L — SIGNIFICANT CHANGE UP (ref 135–146)
SPECIMEN SOURCE: SIGNIFICANT CHANGE UP
WBC # BLD: 7.05 K/UL — SIGNIFICANT CHANGE UP (ref 4.8–10.8)
WBC # FLD AUTO: 7.05 K/UL — SIGNIFICANT CHANGE UP (ref 4.8–10.8)

## 2020-09-11 PROCEDURE — 99232 SBSQ HOSP IP/OBS MODERATE 35: CPT

## 2020-09-11 RX ADMIN — Medication 4 UNIT(S): at 11:30

## 2020-09-11 RX ADMIN — MEMANTINE HYDROCHLORIDE 10 MILLIGRAM(S): 10 TABLET ORAL at 05:09

## 2020-09-11 RX ADMIN — PANTOPRAZOLE SODIUM 40 MILLIGRAM(S): 20 TABLET, DELAYED RELEASE ORAL at 05:10

## 2020-09-11 RX ADMIN — Medication 6: at 11:30

## 2020-09-11 RX ADMIN — Medication 2: at 08:18

## 2020-09-11 RX ADMIN — Medication 4 UNIT(S): at 08:18

## 2020-09-11 NOTE — MEDICAL STUDENT PROGRESS NOTE(EDUCATION) - NS MD HP STUD ASPLAN PLAN FT
1. L greater trochanter fracture  - hx of mechanical fall  - CT abd/pelvis = acute non-displaced L intertrochanteric fx & superiorly displaced greater trochanter avulsion  - s/p L hip IMN & R prox femur CRPP POD #4  - as per ortho: percutaneous pinning done by the orthopedics team, cleared for discharge, transfuse if Hb <7  - as per physiatry: bedside PT/OT, dispo to STR in skilled nursing facility  - d/c today & notify daughter Lilliana ([phone])    2. Normocytic anemia  - Hb 6.9 yesterday before 1U RBC, 8.5 after -> 8.1 today  - s/p 1U RBC  - maintain active T&S    3. T2DM  - glucose today = 116  - continue Lantus 12 U, Humalog 4U, ISS    4. DLD  - continue simvastatin 20 mg    5. Breast cancer s/p mastectomy/chemo outpt  - f/u outpt    6. Alzheimer's disease  - baseline = AOx1  - continue donepezil 10 mg, memantine 10 mg, alprazolam 0.5 mg  - discontinue alprazolam 0.5 mg on discharge    7. Healthcare maintenance  - DVT ppx: enoxaparin 40 mg SQ  - GI ppx: pantoprazole 40 mg  - Diet: DASH/TLC, sodium & cholesterol restricted  - Code status: Full code  - Dispo: pending STR with skilled nursing facility 1. L greater trochanter fracture  - hx of mechanical fall  - CT abd/pelvis = acute non-displaced L intertrochanteric fx & superiorly displaced greater trochanter avulsion  - s/p L hip IMN & R prox femur CRPP POD #4  - as per ortho: percutaneous pinning done by the orthopedics team, cleared for discharge, transfuse if Hb <7  - as per physiatry: bedside PT/OT, dispo to STR in skilled nursing facility  - d/c today & notify daughter Lilliana ([phone])    2. Normocytic anemia  - Hb 6.9 yesterday before 1U RBC, 8.5 after -> 8.1 today  - s/p 1U RBC  - maintain active T&S    3. Hypokalemia  - 3.4 yesterday -> 3.7 today  - improved    4. T2DM  - glucose today = 116  - continue Lantus 12 U, Humalog 4U, ISS    5. DLD  - continue simvastatin 20 mg    6. Breast cancer s/p mastectomy/chemo outpt  - f/u outpt    7. Alzheimer's disease  - baseline = AOx1  - continue donepezil 10 mg, memantine 10 mg, alprazolam 0.5 mg  - discontinue alprazolam 0.5 mg on discharge    8. Healthcare maintenance  - DVT ppx: enoxaparin 40 mg SQ  - GI ppx: pantoprazole 40 mg  - Diet: DASH/TLC, sodium & cholesterol restricted  - Code status: Full code  - Dispo: pending STR with skilled nursing facility

## 2020-09-11 NOTE — PROGRESS NOTE ADULT - REASON FOR ADMISSION
L greater trochanter fracture
L greater trochanter fracture, R femoral neck fracture

## 2020-09-11 NOTE — MEDICAL STUDENT PROGRESS NOTE(EDUCATION) - SUBJECTIVE AND OBJECTIVE BOX
Patient is a 89 year old female with PMH of T2DM, DLD, dementia, breast cancer s/p L hip IMN and R proximal femur CRPP POD #4 presenting for L hip fracture. As per daughter, patient had witnesses fall out of bed 2 days ago onto L hip. No head trauma, LOC. STs went to [place] ED previously and was called back for L hip fracture. At home, patient is mostly wheelchair bound but can walk with cane. She has otherwise been acting at baseline since fall. Denies PO intake, vomiting, worsening confusion (baseline confused AOx1), cough, fever, URI symptoms, urinary or GI sx.    HPI:  89 year old F coming from home, hx of DM, DLD, breast ca s/p rt mastectomy/chemo presenting for poss L hip fx. As per daughter pt had witnesses fall out of bed x 2 days ago onto L hip. No head trauma/loc. STs went to [facility] ED yesterday and was called back for L hip fx. Pt is mostly wheelchair bound but can walk with cane. She has otherwise been acting at baseline since fall. No decreased po intake, vomiting, worsening confusion (baseline confused AO1), cough, fever, uri symptoms, urinary or GI sx.    In the ED, Hg 8.3 with baseline 11.8 from January 2019,  Acute comminuted fracture of the left greater trochanter with 3 cm displacement superiorly. Associated hematoma at the fracture site measuring 3.3 x 2.0 cm, ortho consulted and following (06 Sep 2020 00:13)    Currently admitted to medicine with the primary diagnosis of Greater trochanter fracture     Today is hospital day 6d.     INTERVAL HPI / OVERNIGHT EVENTS:  Patient was examined and seen at bedside. This morning she is resting comfortably in bed and reports no new issues or overnight events.     ROS: Otherwise unremarkable     PAST MEDICAL & SURGICAL HISTORY  Systolic heart failure  Breast CA  Depression  Diabetes  Hypertension  H/O mastectomy, right    ALLERGIES  aspirin (Unknown)    MEDICATIONS  STANDING MEDICATIONS  dextrose 50% Injectable 12.5 Gram(s) IV Push once  dextrose 50% Injectable 25 Gram(s) IV Push once  dextrose 50% Injectable 25 Gram(s) IV Push once  donepezil 10 milliGRAM(s) Oral at bedtime  enoxaparin Injectable 40 milliGRAM(s) SubCutaneous at bedtime  insulin glargine Injectable (LANTUS) 12 Unit(s) SubCutaneous at bedtime  insulin lispro (HumaLOG) corrective regimen sliding scale   SubCutaneous three times a day before meals  insulin lispro Injectable (HumaLOG) 4 Unit(s) SubCutaneous three times a day before meals  memantine 10 milliGRAM(s) Oral two times a day  pantoprazole    Tablet 40 milliGRAM(s) Oral before breakfast  simvastatin 20 milliGRAM(s) Oral at bedtime    PRN MEDICATIONS  dextrose 40% Gel 15 Gram(s) Oral once PRN  glucagon  Injectable 1 milliGRAM(s) IntraMuscular once PRN  morphine  - Injectable 2 milliGRAM(s) IV Push every 6 hours PRN    VITALS:  T(F): 98  HR: 70  BP: 135/65  RR: 18  SpO2: --    PHYSICAL EXAM  GEN: NAD, Resting comfortably in bed  PULM: Clear to auscultation bilaterally, No wheezes  CVS: Regular rate and rhythm, S1-S2, no murmurs  ABD: Soft, non-tender, non-distended, no guarding  EXT: L hip wrapped in soft bandages. No edema  NEURO: A&Ox1, no focal deficits    LABS             8.1    7.05  )-----------( 362      ( 11 Sep 2020 06:28 )             26.0     09-11    143  |  106  |  20  ----------------------------<  195<H>  3.9   |  26  |  0.5<L>    Ca    8.2<L>      11 Sep 2020 06:28  Mg     1.8     09-11    TPro  5.2<L>  /  Alb  2.7<L>  /  TBili  0.6  /  DBili  x   /  AST  10  /  ALT  7   /  AlkPhos  54  09-11    RADIOLOGY  EXAM:  XR CHEST PORTABLE ROUTINE 1V          PROCEDURE DATE:  09/07/2020      INTERPRETATION:  Clinical History / Reason for exam: Sepsis  Comparison : Chest radiograph:  9/582020  Technique/Positioning: Frontal view of the chest.  Findings:  Cardiac/mediastinum/hilum: No significant change  Lung parenchyma/Pleura: There is no focal consolidation, pleural effusion or pneumothorax.  Skeleton/soft tissues: Stable    Impression:  No evidence of focal consolidation, pleural effusion or pneumothorax.  New bilateral reticular opacities.    EXAM:  CT PELVIS ONLY          PROCEDURE DATE:  09/05/2020      INTERPRETATION:  Clinical History / Reason for exam: Fall, left hip pain.  Technique:  Multiaxial CT images of the pelvis were obtained.  Coronal and sagittal reformatted images were obtained.  Comparison: Bilateral hip radiographs of the same day.    Findings:  Acute comminuted fracture of the left greater trochanter with approximately 3 cm displacement superiorly. Diffuse osteopenia. Moderate osteoarthritis of the bilateral hips. Degenerative changes of the lower lumbar spine and pubic symphysis. Small bilateral hip joint effusions.  Hemorrhagic density measuring 3.3 x 2.0 cm at the left greater trochanter fracture site, likely represent hematoma. Diffuse anasarca.  Visualized lower abdomen and pelvis unremarkable.  Atherosclerotic vascular calcifications.    Impression:  Acute comminuted fracture of the left greater trochanter with 3 cm displacement superiorly. Associated hematoma at the fracture site measuring 3.3 x 2.0 cm.  Diffuse osteopenia.  Moderate osteoarthritis of the bilateral hips.  Small bilateral hip joint effusions. Patient is a 89 year old female with PMH of T2DM, DLD, dementia, breast cancer s/p L hip IMN and R proximal femur CRPP POD #4 presenting for L hip fracture. As per daughter, patient had witnesses fall out of bed 2 days ago onto L hip. No head trauma, LOC. STs went to [place] ED previously and was called back for L hip fracture. At home, patient is mostly wheelchair bound but can walk with cane. She has otherwise been acting at baseline since fall. Denies PO intake, vomiting, worsening confusion (baseline confused AOx1), cough, fever, URI symptoms, urinary or GI sx.    In the ED, Hg 8.3 with baseline 11.8 from January 2019,  Acute comminuted fracture of the left greater trochanter with 3 cm displacement superiorly. Associated hematoma at the fracture site measuring 3.3 x 2.0 cm, ortho consulted and following (06 Sep 2020 00:13)    Currently admitted to medicine with the primary diagnosis of Greater trochanter fracture     Today is hospital day 6d.     INTERVAL HPI / OVERNIGHT EVENTS:  Patient was examined and seen at bedside. This morning she is resting comfortably in bed and reports no new issues or overnight events.     ROS: Otherwise unremarkable     PAST MEDICAL & SURGICAL HISTORY  Systolic heart failure  Breast CA  Depression  Diabetes  Hypertension  H/O mastectomy, right    ALLERGIES  aspirin (Unknown)    MEDICATIONS  STANDING MEDICATIONS  dextrose 50% Injectable 12.5 Gram(s) IV Push once  dextrose 50% Injectable 25 Gram(s) IV Push once  dextrose 50% Injectable 25 Gram(s) IV Push once  donepezil 10 milliGRAM(s) Oral at bedtime  enoxaparin Injectable 40 milliGRAM(s) SubCutaneous at bedtime  insulin glargine Injectable (LANTUS) 12 Unit(s) SubCutaneous at bedtime  insulin lispro (HumaLOG) corrective regimen sliding scale   SubCutaneous three times a day before meals  insulin lispro Injectable (HumaLOG) 4 Unit(s) SubCutaneous three times a day before meals  memantine 10 milliGRAM(s) Oral two times a day  pantoprazole    Tablet 40 milliGRAM(s) Oral before breakfast  simvastatin 20 milliGRAM(s) Oral at bedtime    PRN MEDICATIONS  dextrose 40% Gel 15 Gram(s) Oral once PRN  glucagon  Injectable 1 milliGRAM(s) IntraMuscular once PRN  morphine  - Injectable 2 milliGRAM(s) IV Push every 6 hours PRN    VITALS:  T(F): 98  HR: 70  BP: 135/65  RR: 18  SpO2: --    PHYSICAL EXAM  GEN: NAD, Resting comfortably in bed  PULM: Clear to auscultation bilaterally, No wheezes  CVS: Regular rate and rhythm, S1-S2, no murmurs  ABD: Soft, non-tender, non-distended, no guarding  EXT: L hip wrapped in soft bandages. No edema  NEURO: A&Ox1, no focal deficits    LABS             8.1    7.05  )-----------( 362      ( 11 Sep 2020 06:28 )             26.0     09-11    143  |  106  |  20  ----------------------------<  195<H>  3.9   |  26  |  0.5<L>    Ca    8.2<L>      11 Sep 2020 06:28  Mg     1.8     09-11    TPro  5.2<L>  /  Alb  2.7<L>  /  TBili  0.6  /  DBili  x   /  AST  10  /  ALT  7   /  AlkPhos  54  09-11    RADIOLOGY  EXAM:  XR CHEST PORTABLE ROUTINE 1V          PROCEDURE DATE:  09/07/2020      INTERPRETATION:  Clinical History / Reason for exam: Sepsis  Comparison : Chest radiograph:  9/582020  Technique/Positioning: Frontal view of the chest.  Findings:  Cardiac/mediastinum/hilum: No significant change  Lung parenchyma/Pleura: There is no focal consolidation, pleural effusion or pneumothorax.  Skeleton/soft tissues: Stable    Impression:  No evidence of focal consolidation, pleural effusion or pneumothorax.  New bilateral reticular opacities.    EXAM:  CT PELVIS ONLY          PROCEDURE DATE:  09/05/2020      INTERPRETATION:  Clinical History / Reason for exam: Fall, left hip pain.  Technique:  Multiaxial CT images of the pelvis were obtained.  Coronal and sagittal reformatted images were obtained.  Comparison: Bilateral hip radiographs of the same day.    Findings:  Acute comminuted fracture of the left greater trochanter with approximately 3 cm displacement superiorly. Diffuse osteopenia. Moderate osteoarthritis of the bilateral hips. Degenerative changes of the lower lumbar spine and pubic symphysis. Small bilateral hip joint effusions.  Hemorrhagic density measuring 3.3 x 2.0 cm at the left greater trochanter fracture site, likely represent hematoma. Diffuse anasarca.  Visualized lower abdomen and pelvis unremarkable.  Atherosclerotic vascular calcifications.    Impression:  Acute comminuted fracture of the left greater trochanter with 3 cm displacement superiorly. Associated hematoma at the fracture site measuring 3.3 x 2.0 cm.  Diffuse osteopenia.  Moderate osteoarthritis of the bilateral hips.  Small bilateral hip joint effusions.

## 2020-09-11 NOTE — DISCHARGE NOTE NURSING/CASE MANAGEMENT/SOCIAL WORK - PATIENT PORTAL LINK FT
You can access the FollowMyHealth Patient Portal offered by [facility] by registering at the following website: [email or web address]. By joining [facility]’s FollowMyHealth portal, you will also be able to view your health information using other applications (apps) compatible with our system.

## 2020-09-11 NOTE — PROGRESS NOTE ADULT - PROVIDER SPECIALTY LIST ADULT
Hospitalist
Hospitalist
Internal Medicine
Orthopedics
Internal Medicine
Hospitalist

## 2020-09-11 NOTE — PROGRESS NOTE ADULT - ASSESSMENT
HPI:  89 year old F coming from home, hx of DM, DLD, breast ca s/p rt mastectomy/chemo presenting for poss L hip fx. As per daughter pt had witnesses fall out of bed x 2 days ago onto L hip. No head trauma/loc. STs went to [facility] ED yesterday and was called back for L hip fx. Pt is mostly wheelchair bound but can walk with cane. She has otherwise been acting at baseline since fall. No decreased po intake, vomiting, worsening confusion (baseline confused AO1), cough, fever, uri symptoms, urinary or GI sx.    #L greater trochanter fracture   sp IMN  9/7     #acute blood loss anemia on chronic normocytic anemia suspected to be secondary to post op ,   hemoglobin stable now     #DM II   CAPILLARY BLOOD GLUCOSE      POCT Blood Glucose.: 254 mg/dL (11 Sep 2020 11:25)  POCT Blood Glucose.: 199 mg/dL (11 Sep 2020 07:31)  POCT Blood Glucose.: 193 mg/dL (10 Sep 2020 21:50)  POCT Blood Glucose.: 121 mg/dL (10 Sep 2020 16:50)  POCT Blood Glucose.: 168 mg/dL (10 Sep 2020 11:40)    controlled     #DLD     #Acute hypokalemia - resolved    #breast cancer s/p rt mastectomy/ chemo outpatient follow up    #alzheimer dementia  at baseline , on memantine    Progress Note Handoff    Pending:  DC planning  Family discussion: left message at 11:31 AM  [id number]  Disposition: STR

## 2020-09-11 NOTE — PROGRESS NOTE ADULT - SUBJECTIVE AND OBJECTIVE BOX
[facility]  89[id number] [address] [address]      Patient is a 89y old  Female who presents with a chief complaint of L greater trochanter fracture (10 Sep 2020 12:14)      INTERVAL HPI/OVERNIGHT EVENTS:  no acute events overnight       REVIEW OF SYSTEMS:  ROS neg  FAMILY HISTORY:  No pertinent family history in first degree relatives    T(C): 36.7 (09-11-20 @ 08:06), Max: 37.7 (09-11-20 @ 00:00)  HR: 70 (09-11-20 @ 08:06) (70 - 84)  BP: 135/65 (09-11-20 @ 08:06) (135/65 - 141/65)  RR: 18 (09-11-20 @ 08:06) (17 - 18)  SpO2: --  Wt(kg): --Vital Signs Last 24 Hrs  T(C): 36.7 (11 Sep 2020 08:06), Max: 37.7 (11 Sep 2020 00:00)  T(F): 98 (11 Sep 2020 08:06), Max: 99.8 (11 Sep 2020 00:00)  HR: 70 (11 Sep 2020 08:06) (70 - 84)  BP: 135/65 (11 Sep 2020 08:06) (135/65 - 141/65)  BP(mean): --  RR: 18 (11 Sep 2020 08:06) (17 - 18)  SpO2: --    PHYSICAL EXAM:  GEN Lying in no acute distress  HEENT Pupils equal and reactive to light and accommodationSupple Neck  PULM Clear to auscultation bilaterally  CV s1s2 regular rate and rhythm  GI + bowel sounds nontnender  EXT no cyanosis or edema  PSYCH awake alert   INTEG No Lesions  NEURO Moves all extremities      LABS:                            8.1    7.05  )-----------( 362      ( 11 Sep 2020 06:28 )             26.0   09-11    143  |  106  |  20  ----------------------------<  195<H>  3.9   |  26  |  0.5<L>    Ca    8.2<L>      11 Sep 2020 06:28  Mg     1.8     09-11    TPro  5.2<L>  /  Alb  2.7<L>  /  TBili  0.6  /  DBili  x   /  AST  10  /  ALT  7   /  AlkPhos  54  09-11            dextrose 40% Gel 15 Gram(s) Oral once PRN  dextrose 50% Injectable 12.5 Gram(s) IV Push once  dextrose 50% Injectable 25 Gram(s) IV Push once  dextrose 50% Injectable 25 Gram(s) IV Push once  donepezil 10 milliGRAM(s) Oral at bedtime  enoxaparin Injectable 40 milliGRAM(s) SubCutaneous at bedtime  glucagon  Injectable 1 milliGRAM(s) IntraMuscular once PRN  insulin glargine Injectable (LANTUS) 12 Unit(s) SubCutaneous at bedtime  insulin lispro (HumaLOG) corrective regimen sliding scale   SubCutaneous three times a day before meals  insulin lispro Injectable (HumaLOG) 4 Unit(s) SubCutaneous three times a day before meals  memantine 10 milliGRAM(s) Oral two times a day  morphine  - Injectable 2 milliGRAM(s) IV Push every 6 hours PRN  pantoprazole    Tablet 40 milliGRAM(s) Oral before breakfast  simvastatin 20 milliGRAM(s) Oral at bedtime      HEALTH ISSUES - PROBLEM Dx:          Case Discussed with House Staff     Spectra [phone]

## 2020-10-06 NOTE — OCCUPATIONAL THERAPY INITIAL EVALUATION ADULT - PERSONAL SAFETY AND JUDGMENT, REHAB EVAL
If you are a smoker, it is important for your health to stop smoking. Please be aware that second hand smoke is also harmful. impaired

## 2021-10-20 NOTE — PRE-OP CHECKLIST - ANTIBIOTIC
OVERNIGHT EVENTS: CHRISTOPHER    SUBJECTIVE: Patient seen and examined at bedside. Patient has no new complaints, still having some mild persistent cough, right ear pan and complains of her diet. Patient denies h/n/v/d, fever, chills, cp, palpitations, sob, abd pain, leg swelling, rashes, dysuria, and changes in BM.     Vital Signs Last 12 Hrs  T(F): 97.7 (10-20-21 @ 06:32), Max: 97.7 (10-20-21 @ 06:32)  HR: 62 (10-20-21 @ 06:32) (62 - 62)  BP: 144/79 (10-20-21 @ 06:32) (144/79 - 144/79)  BP(mean): --  RR: 18 (10-20-21 @ 06:32) (18 - 18)  SpO2: 96% (10-20-21 @ 06:32) (96% - 96%)  I&O's Summary    19 Oct 2021 07:01  -  20 Oct 2021 07:00  --------------------------------------------------------  IN: 920 mL / OUT: 1200 mL / NET: -280 mL    PHYSICAL EXAM:  Constitutional: elderly woman in no acute distress, comfortable in bed on 2L NC  HEENT: NC/AT, PERRLA, EOMI, no conjunctival pallor or scleral icterus, MMM  Neck: Supple  Respiratory: CTA B/L. No w/r/r.   Cardiovascular: RRR, normal S1 and S2, no m/r/g.   Gastrointestinal: +BS, soft NTND, no guarding or rebound tenderness, no palpable masses   Extremities: wwp; no cyanosis, clubbing or edema.   Vascular: Pulses equal and strong throughout.   Neurological: AAOx3, no CN deficits, strength and sensation intact throughout.   Skin: No gross skin abnormalities or rashes    LABS:                        13.6   11.67 )-----------( 291      ( 20 Oct 2021 07:55 )             43.4     10-20    138  |  101  |  62<H>  ----------------------------<  84  4.6   |  24  |  2.46<H>    Ca    9.2      20 Oct 2021 07:55  Phos  3.7     10-20  Mg     2.3     10-20    MEDICATIONS  (STANDING):  amLODIPine   Tablet 10 milliGRAM(s) Oral every 24 hours  chlorhexidine 2% Cloths 1 Application(s) Topical <User Schedule>  dextrose 40% Gel 15 Gram(s) Oral once  dextrose 5%. 1000 milliLiter(s) (50 mL/Hr) IV Continuous <Continuous>  dextrose 5%. 1000 milliLiter(s) (100 mL/Hr) IV Continuous <Continuous>  dextrose 50% Injectable 25 Gram(s) IV Push once  dextrose 50% Injectable 12.5 Gram(s) IV Push once  dextrose 50% Injectable 25 Gram(s) IV Push once  glucagon  Injectable 1 milliGRAM(s) IntraMuscular once  guaiFENesin Oral Liquid (Sugar-Free) 200 milliGRAM(s) Oral every 6 hours  heparin   Injectable 5000 Unit(s) SubCutaneous every 8 hours  insulin glargine Injectable (LANTUS) 18 Unit(s) SubCutaneous at bedtime  insulin lispro (ADMELOG) corrective regimen sliding scale   SubCutaneous Before meals and at bedtime  insulin lispro Injectable (ADMELOG) 10 Unit(s) SubCutaneous three times a day before meals  metoprolol succinate  milliGRAM(s) Oral every 24 hours  NIFEdipine XL 60 milliGRAM(s) Oral every 24 hours  polyethylene glycol 3350 17 Gram(s) Oral daily  senna 2 Tablet(s) Oral at bedtime  simvastatin 20 milliGRAM(s) Oral at bedtime    MEDICATIONS  (PRN):  acetaminophen   Tablet .. 650 milliGRAM(s) Oral every 6 hours PRN Temp greater or equal to 38C (100.4F), Mild Pain (1 - 3)  benzocaine 15 mG/menthol 3.6 mG Lozenge 1 Lozenge Oral every 4 hours PRN Sore Throat   n/a